# Patient Record
Sex: MALE | Race: WHITE | NOT HISPANIC OR LATINO | Employment: FULL TIME | URBAN - METROPOLITAN AREA
[De-identification: names, ages, dates, MRNs, and addresses within clinical notes are randomized per-mention and may not be internally consistent; named-entity substitution may affect disease eponyms.]

---

## 2019-12-02 ENCOUNTER — TELEPHONE (OUTPATIENT)
Dept: FAMILY MEDICINE | Facility: CLINIC | Age: 49
End: 2019-12-02

## 2019-12-02 NOTE — TELEPHONE ENCOUNTER
----- Message from Lv Aponte sent at 2019  8:23 AM CST -----  Contact: patient  Chao Chapman  MRN: [id number]  : 1970  PCP: Primary Doctor No  Home Phone      [phone]  Work Phone      Not on file.  [employer]          [phone]      MESSAGE: new patient ([employer]) -- LOV  -- lingering cough - now wheezing -- requesting appt today    Call [phone]    PCP: [place]

## 2019-12-02 NOTE — TELEPHONE ENCOUNTER
Spoke to pt stated that he would like to est care ,stated with a cold least Monday. Started with wheezing and been taking OTC meds. No history of asmtha. Declines fever. Will seek treatment at urgent care.

## 2019-12-09 ENCOUNTER — TELEPHONE (OUTPATIENT)
Dept: FAMILY MEDICINE | Facility: CLINIC | Age: 49
End: 2019-12-09

## 2019-12-09 NOTE — TELEPHONE ENCOUNTER
----- Message from Cristine King sent at 2019  8:18 AM CST -----  Contact: self  Chao Chapman  MRN: [id number]  : 1970  PCP: Primary Doctor No  Home Phone      [phone]  Work Phone      Not on file.  Mobile          [phone]      MESSAGE:   Patient would like to establish care, [employer] insurance.    Maybe has a hernia and wants to see a man doctor.    [phone]

## 2019-12-09 NOTE — TELEPHONE ENCOUNTER
Pt would like to re-est care with you. He last seen you on 1/27/2016.  Pt stated for 2 weeks now his right testicle has been swollen, but this morning he woke up with pain from his hips down to his feet. He believes this could be a possible hernia.   Only medication he is taking is Remicade IV.   Will you accept him as a pt again?

## 2020-01-08 ENCOUNTER — HOSPITAL ENCOUNTER (OUTPATIENT)
Dept: RADIOLOGY | Facility: HOSPITAL | Age: 50
Discharge: HOME | End: 2020-01-08
Attending: FAMILY MEDICINE
Payer: COMMERCIAL

## 2020-01-08 ENCOUNTER — OFFICE VISIT (OUTPATIENT)
Dept: FAMILY MEDICINE | Facility: CLINIC | Age: 50
End: 2020-01-08
Payer: COMMERCIAL

## 2020-01-08 VITALS
HEIGHT: 67 IN | SYSTOLIC BLOOD PRESSURE: 116 MMHG | HEART RATE: 70 BPM | RESPIRATION RATE: 16 BRPM | DIASTOLIC BLOOD PRESSURE: 78 MMHG | BODY MASS INDEX: 25.99 KG/M2 | WEIGHT: 165.56 LBS

## 2020-01-08 DIAGNOSIS — N45.2 ORCHITIS: Primary | ICD-10-CM

## 2020-01-08 DIAGNOSIS — N50.89 TESTICULAR SWELLING, LEFT: Primary | ICD-10-CM

## 2020-01-08 DIAGNOSIS — N50.89 TESTICULAR SWELLING, LEFT: ICD-10-CM

## 2020-01-08 DIAGNOSIS — N45.1 EPIDIDYMITIS: ICD-10-CM

## 2020-01-08 DIAGNOSIS — F17.200 SMOKER: ICD-10-CM

## 2020-01-08 PROCEDURE — 3008F PR BODY MASS INDEX (BMI) DOCUMENTED: ICD-10-PCS | Mod: CPTII,S$GLB,, | Performed by: FAMILY MEDICINE

## 2020-01-08 PROCEDURE — 99999 PR PBB SHADOW E&M-EST. PATIENT-LVL III: ICD-10-PCS | Mod: PBBFAC,,, | Performed by: FAMILY MEDICINE

## 2020-01-08 PROCEDURE — 99214 OFFICE O/P EST MOD 30 MIN: CPT | Mod: S$GLB,,, | Performed by: FAMILY MEDICINE

## 2020-01-08 PROCEDURE — 99999 PR PBB SHADOW E&M-EST. PATIENT-LVL III: CPT | Mod: PBBFAC,,, | Performed by: FAMILY MEDICINE

## 2020-01-08 PROCEDURE — 76870 US EXAM SCROTUM: CPT | Mod: TC

## 2020-01-08 PROCEDURE — 3008F BODY MASS INDEX DOCD: CPT | Mod: CPTII,S$GLB,, | Performed by: FAMILY MEDICINE

## 2020-01-08 PROCEDURE — 99214 PR OFFICE/OUTPT VISIT, EST, LEVL IV, 30-39 MIN: ICD-10-PCS | Mod: S$GLB,,, | Performed by: FAMILY MEDICINE

## 2020-01-08 RX ORDER — DOXYCYCLINE HYCLATE 100 MG
100 TABLET ORAL 2 TIMES DAILY
Qty: 60 TABLET | Refills: 0 | Status: SHIPPED | OUTPATIENT
Start: 2020-01-08 | End: 2020-02-07

## 2020-01-08 NOTE — PROGRESS NOTES
Subjective:       Patient ID: Chao Chapman is a 49 y.o. male.    Chief Complaint: re-est care    Pt is a 49 y.o. male who presents for evaluation and management of   Encounter Diagnoses   Name Primary?    Testicular swelling, left Yes    Smoker    .7 weeks   No pain   No penile discharge   No injury     Doing well on current meds. Denies any side effects. Prevention is up to date.    Review of Systems   Constitutional: Negative for chills and fever.   Respiratory: Negative for shortness of breath.    Cardiovascular: Negative for chest pain.   Gastrointestinal: Negative for abdominal pain, blood in stool, nausea and vomiting.   Genitourinary: Positive for scrotal swelling. Negative for discharge, dysuria, genital sores and testicular pain.       Objective:      Physical Exam   Constitutional: He is oriented to person, place, and time. He appears well-developed and well-nourished.   HENT:   Head: Normocephalic and atraumatic.   Right Ear: External ear normal.   Left Ear: External ear normal.   Nose: Nose normal.   Mouth/Throat: Oropharynx is clear and moist.   Eyes: Pupils are equal, round, and reactive to light. Conjunctivae and EOM are normal. Right eye exhibits no discharge. Left eye exhibits no discharge. No scleral icterus.   Neck: Normal range of motion. Neck supple. No JVD present. No tracheal deviation present. No thyromegaly present.   Cardiovascular: Normal rate, regular rhythm, normal heart sounds and intact distal pulses.   No murmur heard.  Pulmonary/Chest: Effort normal and breath sounds normal. No respiratory distress. He has no wheezes. He has no rales. He exhibits no tenderness.   Abdominal: Soft. Bowel sounds are normal. He exhibits no distension and no mass. There is no tenderness. There is no rebound and no guarding.   Genitourinary:   Genitourinary Comments: RIGHT TESTICLE ENLARGED AND INDURATED      Musculoskeletal: Normal range of motion.   Lymphadenopathy:     He has no cervical adenopathy.    Neurological: He is alert and oriented to person, place, and time. He has normal reflexes. He displays normal reflexes. No cranial nerve deficit. He exhibits normal muscle tone. Coordination normal.   Skin: Skin is warm and dry.   Psychiatric: He has a normal mood and affect. His behavior is normal. Judgment and thought content normal.       Assessment:       1. Testicular swelling, left    2. Smoker        Plan:   Chao was seen today for re-est care.    Diagnoses and all orders for this visit:    Testicular swelling, left  -     US Scrotum And Testicles; Future    Smoker  -     Lipid panel; Future  -     Comprehensive metabolic panel; Future  -     Ambulatory referral to Smoking Cessation Program      Problem List Items Addressed This Visit     None      Visit Diagnoses     Testicular swelling, left    -  Primary    Relevant Orders    US Scrotum And Testicles    Smoker        Relevant Orders    Lipid panel    Comprehensive metabolic panel    Ambulatory referral to Smoking Cessation Program        No follow-ups on file.

## 2020-01-09 NOTE — PROGRESS NOTES
He has what looks like an infection in his testicle. I need him to be on abx for a month---I sent in doxy  Come see me back in a month. We will need to repeat u/s at that time

## 2020-02-12 ENCOUNTER — TELEPHONE (OUTPATIENT)
Dept: FAMILY MEDICINE | Facility: CLINIC | Age: 50
End: 2020-02-12

## 2020-02-12 DIAGNOSIS — N45.1 EPIDIDYMITIS: Primary | ICD-10-CM

## 2020-02-12 NOTE — TELEPHONE ENCOUNTER
----- Message from Keren Mistry sent at 2020  2:41 PM CST -----  Contact: self  Chao Chapman  MRN: [id number]  : 1970  PCP: Primary Doctor No  Home Phone      [phone]  Work Phone      Not on file.  Mobile          [phone]      MESSAGE:   Pt states he was instructed to call when he was almost finished his antibiotics to get another ultrasound scheduled. Please return call @ [phone]. Thanks.

## 2020-02-12 NOTE — TELEPHONE ENCOUNTER
Pt stated he is almost finished his abx. He said that he was informed to call to get another ultrasound scheduled.   If pts needs this please order test. Thank you

## 2020-02-20 ENCOUNTER — TELEMEDICINE (OUTPATIENT)
Dept: FAMILY MEDICINE | Facility: CLINIC | Age: 50
End: 2020-02-20

## 2020-02-20 ENCOUNTER — HOSPITAL ENCOUNTER (OUTPATIENT)
Dept: RADIOLOGY | Facility: HOSPITAL | Age: 50
Discharge: HOME | End: 2020-02-20
Attending: FAMILY MEDICINE
Payer: COMMERCIAL

## 2020-02-20 DIAGNOSIS — N45.1 EPIDIDYMITIS: ICD-10-CM

## 2020-02-20 PROCEDURE — 76870 US EXAM SCROTUM: CPT | Mod: 26,,, | Performed by: RADIOLOGY

## 2020-02-20 PROCEDURE — 76870 US EXAM SCROTUM: CPT | Mod: TC

## 2020-02-20 PROCEDURE — 76870 US SCROTUM AND TESTICLES: ICD-10-PCS | Mod: 26,,, | Performed by: RADIOLOGY

## 2020-02-21 ENCOUNTER — TELEPHONE (OUTPATIENT)
Dept: FAMILY MEDICINE | Facility: CLINIC | Age: 50
End: 2020-02-21

## 2020-02-21 DIAGNOSIS — D49.59 TESTICULAR NEOPLASM: Primary | ICD-10-CM

## 2020-02-21 NOTE — TELEPHONE ENCOUNTER
Scheduled pt an urology appt at [facility] with Dr. Harjit Tyler on February 26 at 3pm.  Called pt and notified him of appt, he verbally understood

## 2020-02-26 ENCOUNTER — LAB VISIT (OUTPATIENT)
Dept: LAB | Facility: HOSPITAL | Age: 50
End: 2020-02-26
Attending: UROLOGY
Payer: COMMERCIAL

## 2020-02-26 ENCOUNTER — TELEPHONE (OUTPATIENT)
Dept: UROLOGY | Facility: CLINIC | Age: 50
End: 2020-02-26

## 2020-02-26 ENCOUNTER — OFFICE VISIT (OUTPATIENT)
Dept: UROLOGY | Facility: CLINIC | Age: 50
End: 2020-02-26
Payer: COMMERCIAL

## 2020-02-26 VITALS
BODY MASS INDEX: 25.96 KG/M2 | SYSTOLIC BLOOD PRESSURE: 128 MMHG | HEIGHT: 67 IN | HEART RATE: 72 BPM | DIASTOLIC BLOOD PRESSURE: 83 MMHG | WEIGHT: 165.38 LBS

## 2020-02-26 DIAGNOSIS — D49.59 TESTICULAR NEOPLASM: ICD-10-CM

## 2020-02-26 DIAGNOSIS — N50.89 TESTICULAR MASS: Primary | ICD-10-CM

## 2020-02-26 LAB
AFP SERPL-MCNC: 2.2 NG/ML (ref 0–8.4)
CREAT SERPL-MCNC: 1 MG/DL (ref 0.5–1.4)
EST. GFR  (AFRICAN AMERICAN): >60 ML/MIN/1.73 M^2
EST. GFR  (NON AFRICAN AMERICAN): >60 ML/MIN/1.73 M^2
HCG INTACT+B SERPL-ACNC: 16 MIU/ML
LDH SERPL L TO P-CCNC: 212 U/L (ref 110–260)

## 2020-02-26 PROCEDURE — 99244 PR OFFICE CONSULTATION,LEVEL IV: ICD-10-PCS | Mod: S$GLB,,, | Performed by: UROLOGY

## 2020-02-26 PROCEDURE — 84702 CHORIONIC GONADOTROPIN TEST: CPT

## 2020-02-26 PROCEDURE — 36415 COLL VENOUS BLD VENIPUNCTURE: CPT

## 2020-02-26 PROCEDURE — 99999 PR PBB SHADOW E&M-EST. PATIENT-LVL III: CPT | Mod: PBBFAC,,, | Performed by: UROLOGY

## 2020-02-26 PROCEDURE — 99999 PR PBB SHADOW E&M-EST. PATIENT-LVL III: ICD-10-PCS | Mod: PBBFAC,,, | Performed by: UROLOGY

## 2020-02-26 PROCEDURE — 82565 ASSAY OF CREATININE: CPT

## 2020-02-26 PROCEDURE — 83615 LACTATE (LD) (LDH) ENZYME: CPT

## 2020-02-26 PROCEDURE — 99244 OFF/OP CNSLTJ NEW/EST MOD 40: CPT | Mod: S$GLB,,, | Performed by: UROLOGY

## 2020-02-26 PROCEDURE — 82105 ALPHA-FETOPROTEIN SERUM: CPT

## 2020-02-26 NOTE — H&P (VIEW-ONLY)
CHIEF COMPLAINT:    Mr. Chapman is a 49 y.o. male presenting for a consultation at the request of Dr. Oliva. Patient presents with R testicular swelling.    PRESENTING ILLNESS:    Chao Chapman is a 49 y.o. male who noticed R testicular swelling > 4 months ago.  Originally grew in size, but it has stabilized over the past month.  He presented to his PCP who diagnosed with him orchitis.  A scrotal u/s was done that was c/w orchitis.  Was given a 1 month course of doxy with no change in the swelling.  He then underwent a repeat scrotal u/s showing no change.  I reviewed his films personally.  No pain.      He has LUTS.  Nocturia x 1.  Good FOS.  No hematuria.  No dysuria.    He denies ED.    REVIEW OF SYSTEMS:    Chao Chapman denies headache, blurred vision, fever, nausea, vomiting, chills, abdominal pain, chest pain, sore throat, bleeding per rectum, cough, SOB, recent loss of consciousness, recent mental status changes, seizures, dizziness, or upper or lower extremity weakness.    JEIMY  1. 5  2. 5  3. 5  4. 5  5. 5      PATIENT HISTORY:    Past Medical History:   Diagnosis Date    Abscess of groin, right     Abscess of right thigh     Anemia     Colonic fistula     Crohn's disease     History of abdominal abscess     Perforated bowel     Severe sepsis        Past Surgical History:   Procedure Laterality Date    COLONOSCOPY      COLONOSCOPY N/A 11/29/2016    Procedure: COLONOSCOPY;  Surgeon: Danika Saunders MD;  Location: [facility];  Service: Endoscopy;  Laterality: N/A;    COLONOSCOPY N/A 8/29/2019    Procedure: COLONOSCOPY;  Surgeon: Melecio Saunders MD;  Location: [facility];  Service: Endoscopy;  Laterality: N/A;    ILEOSTOMY      ILEOSTOMY CLOSURE  07/22/2016    SKIN BIOPSY      SMALL INTESTINE SURGERY      UPPER GASTROINTESTINAL ENDOSCOPY         Family History   Problem Relation Age of Onset    Diabetes Maternal Grandmother        Social History     Socioeconomic History     Marital status:      Spouse name: Not on file    Number of children: Not on file    Years of education: unknown    Highest education level: Not on file   Occupational History    Not on file   Social Needs    Financial resource strain: Not on file    Food insecurity:     Worry: Not on file     Inability: Not on file    Transportation needs:     Medical: Not on file     Non-medical: Not on file   Tobacco Use    Smoking status: Current Every Day Smoker     Packs/day: 0.50     Types: Cigarettes    Smokeless tobacco: Never Used   Substance and Sexual Activity    Alcohol use: No    Drug use: Yes     Types: Marijuana     Comment: daily    Sexual activity: Yes     Partners: Female   Lifestyle    Physical activity:     Days per week: Not on file     Minutes per session: Not on file    Stress: Not on file   Relationships    Social connections:     Talks on phone: Not on file     Gets together: Not on file     Attends Sikh service: Not on file     Active member of club or organization: Not on file     Attends meetings of clubs or organizations: Not on file     Relationship status: Not on file   Other Topics Concern    Not on file   Social History Narrative    Not on file       Allergies:  Patient has no known allergies.    Medications:    Current Outpatient Medications:     INFLIXIMAB (REMICADE IV), Inject into the vein., Disp: , Rfl:     PHYSICAL EXAMINATION:    The patient generally appears in good health, is appropriately interactive, and is in no apparent distress.     Eyes: anicteric sclerae, moist conjunctivae; no lid-lag; PERRLA     HENT: Atraumatic; oropharynx clear with moist mucous membranes and no mucosal ulcerations;normal hard and soft palate.  No evidence of lymphadenopathy.    Neck: Trachea midline.  No thyromegaly.    Musculoskeletal: No abnormal gait.    Skin: No lesions.    Mental: Cooperative with normal affect.  Is oriented to time, place, and person.    Neuro: Grossly  intact.    Chest: Normal inspiratory effort.   No accessory muscles.  No audible wheezes.  Respirations symmetric on inspiration and expiration.    Heart: Regular rhythm.      Abdomen:  Soft, non-tender. No masses or organomegaly. Bladder is not palpable. No evidence of flank discomfort. No evidence of inguinal hernia.    Genitourinary: The penis is circumcised with no evidence of plaques or induration. The urethral meatus is normal. The testes, epididymides, and cord structures are normal in size and contour on the L.. His R testicle is firm.      Extremities: No clubbing, cyanosis, or edema      LABS:      No results found for: PSA, PSADIAG, PSATOTAL, PSAFREE, PSAFREEPCT    IMPRESSION:    Encounter Diagnoses   Name Primary?    Testicular neoplasm          PLAN:    1. Discussed that his exam and u/s are concerning for malignancy.  Recommend R orchiectomy.  2. Will draw LDH, HCG, and AFP.  3. Will get CT chest/abd/pelvis.  4. I  have explained the risk, benefits, and alternatives of the procedure in detail. The patient voices understanding and all questions have been answered. The patient agrees to proceed as planned.      Copy to:

## 2020-02-26 NOTE — LETTER
February 26, 2020      Miguel Oliva MD  [address]  [facility] [address]           [facility] 4th [facility]  [address]  [facility] [address]  Phone: [phone]          Patient: Chao Chapman   MR Number: [id number]   YOB: 1970   Date of Visit: 2/26/2020       Dear Dr. Miguel Oliva:    Thank you for referring Chao Chapman to me for evaluation. Attached you will find relevant portions of my assessment and plan of care.    If you have questions, please do not hesitate to call me. I look forward to following Chao Chapman along with you.    Sincerely,    Harjit Tyler MD    Enclosure  CC:  No Recipients    If you would like to receive this communication electronically, please contact [email or web address] or [phone] to request more information on [employer] Link access.    For providers and/or their staff who would like to refer a patient to [facility], please contact us through our one-stop-shop provider referral line, [facility], at [phone].    If you feel you have received this communication in error or would no longer like to receive these types of communications, please e-mail [email or web address]

## 2020-02-26 NOTE — PROGRESS NOTES
CHIEF COMPLAINT:    Mr. Chapman is a 49 y.o. male presenting for a consultation at the request of Dr. Oliva. Patient presents with R testicular swelling.    PRESENTING ILLNESS:    Chao Chapman is a 49 y.o. male who noticed R testicular swelling > 4 months ago.  Originally grew in size, but it has stabilized over the past month.  He presented to his PCP who diagnosed with him orchitis.  A scrotal u/s was done that was c/w orchitis.  Was given a 1 month course of doxy with no change in the swelling.  He then underwent a repeat scrotal u/s showing no change.  I reviewed his films personally.  No pain.      He has LUTS.  Nocturia x 1.  Good FOS.  No hematuria.  No dysuria.    He denies ED.    REVIEW OF SYSTEMS:    Chao Chapman denies headache, blurred vision, fever, nausea, vomiting, chills, abdominal pain, chest pain, sore throat, bleeding per rectum, cough, SOB, recent loss of consciousness, recent mental status changes, seizures, dizziness, or upper or lower extremity weakness.    JEIMY  1. 5  2. 5  3. 5  4. 5  5. 5      PATIENT HISTORY:    Past Medical History:   Diagnosis Date    Abscess of groin, right     Abscess of right thigh     Anemia     Colonic fistula     Crohn's disease     History of abdominal abscess     Perforated bowel     Severe sepsis        Past Surgical History:   Procedure Laterality Date    COLONOSCOPY      COLONOSCOPY N/A 11/29/2016    Procedure: COLONOSCOPY;  Surgeon: Danika Saunders MD;  [facility]  Service: Endoscopy;  Laterality: N/A;    COLONOSCOPY N/A 8/29/2019    Procedure: COLONOSCOPY;  Surgeon: Melecio Saunders MD;  [facility];  Service: Endoscopy;  Laterality: N/A;    ILEOSTOMY      ILEOSTOMY CLOSURE  07/22/2016    SKIN BIOPSY      SMALL INTESTINE SURGERY      UPPER GASTROINTESTINAL ENDOSCOPY         Family History   Problem Relation Age of Onset    Diabetes Maternal Grandmother        Social History     Socioeconomic History     Marital status:      Spouse name: Not on file    Number of children: Not on file    Years of education: unknown    Highest education level: Not on file   Occupational History    Not on file   Social Needs    Financial resource strain: Not on file    Food insecurity:     Worry: Not on file     Inability: Not on file    Transportation needs:     Medical: Not on file     Non-medical: Not on file   Tobacco Use    Smoking status: Current Every Day Smoker     Packs/day: 0.50     Types: Cigarettes    Smokeless tobacco: Never Used   Substance and Sexual Activity    Alcohol use: No    Drug use: Yes     Types: Marijuana     Comment: daily    Sexual activity: Yes     Partners: Female   Lifestyle    Physical activity:     Days per week: Not on file     Minutes per session: Not on file    Stress: Not on file   Relationships    Social connections:     Talks on phone: Not on file     Gets together: Not on file     Attends Restorationist service: Not on file     Active member of club or organization: Not on file     Attends meetings of clubs or organizations: Not on file     Relationship status: Not on file   Other Topics Concern    Not on file   Social History Narrative    Not on file       Allergies:  Patient has no known allergies.    Medications:    Current Outpatient Medications:     INFLIXIMAB (REMICADE IV), Inject into the vein., Disp: , Rfl:     PHYSICAL EXAMINATION:    The patient generally appears in good health, is appropriately interactive, and is in no apparent distress.     Eyes: anicteric sclerae, moist conjunctivae; no lid-lag; PERRLA     HENT: Atraumatic; oropharynx clear with moist mucous membranes and no mucosal ulcerations;normal hard and soft palate.  No evidence of lymphadenopathy.    Neck: Trachea midline.  No thyromegaly.    Musculoskeletal: No abnormal gait.    Skin: No lesions.    Mental: Cooperative with normal affect.  Is oriented to time, place, and person.    Neuro: Grossly  intact.    Chest: Normal inspiratory effort.   No accessory muscles.  No audible wheezes.  Respirations symmetric on inspiration and expiration.    Heart: Regular rhythm.      Abdomen:  Soft, non-tender. No masses or organomegaly. Bladder is not palpable. No evidence of flank discomfort. No evidence of inguinal hernia.    Genitourinary: The penis is circumcised with no evidence of plaques or induration. The urethral meatus is normal. The testes, epididymides, and cord structures are normal in size and contour on the L.. His R testicle is firm.      Extremities: No clubbing, cyanosis, or edema      LABS:      No results found for: PSA, PSADIAG, PSATOTAL, PSAFREE, PSAFREEPCT    IMPRESSION:    Encounter Diagnoses   Name Primary?    Testicular neoplasm          PLAN:    1. Discussed that his exam and u/s are concerning for malignancy.  Recommend R orchiectomy.  2. Will draw LDH, HCG, and AFP.  3. Will get CT chest/abd/pelvis.  4. I  have explained the risk, benefits, and alternatives of the procedure in detail. The patient voices understanding and all questions have been answered. The patient agrees to proceed as planned.      Copy to:

## 2020-02-26 NOTE — LETTER
February 26, 2020        Miguel Oliva MD  [address]  [facility] [address]             [facility] - Urology 4th Floor  [address]  [facility] [address]  Phone: [phone]   Patient: Chao Chapman   MR Number: [id number]   YOB: 1970   Date of Visit: 2/26/2020       Dear Dr. Oliva:    Thank you for referring Chao Chapman to me for evaluation. Attached you will find relevant portions of my assessment and plan of care.    If you have questions, please do not hesitate to call me. I look forward to following Chao Chapman along with you.    Sincerely,      Harjit Tyler MD            CC  No Recipients    Enclosure

## 2020-03-02 ENCOUNTER — TELEPHONE (OUTPATIENT)
Dept: UROLOGY | Facility: CLINIC | Age: 50
End: 2020-03-02

## 2020-03-02 ENCOUNTER — HOSPITAL ENCOUNTER (OUTPATIENT)
Dept: RADIOLOGY | Facility: HOSPITAL | Age: 50
Discharge: HOME | End: 2020-03-02
Attending: UROLOGY
Payer: COMMERCIAL

## 2020-03-02 DIAGNOSIS — D49.59 TESTICULAR NEOPLASM: ICD-10-CM

## 2020-03-02 PROCEDURE — 25500020 PHARM REV CODE 255: Performed by: UROLOGY

## 2020-03-02 PROCEDURE — 74177 CT CHEST ABDOMEN PELVIS WITH CONTRAST (XPD): ICD-10-PCS | Mod: 26,,, | Performed by: INTERNAL MEDICINE

## 2020-03-02 PROCEDURE — 74177 CT ABD & PELVIS W/CONTRAST: CPT | Mod: 26,,, | Performed by: INTERNAL MEDICINE

## 2020-03-02 PROCEDURE — 74177 CT ABD & PELVIS W/CONTRAST: CPT | Mod: TC

## 2020-03-02 PROCEDURE — 71260 CT CHEST ABDOMEN PELVIS WITH CONTRAST (XPD): ICD-10-PCS | Mod: 26,,, | Performed by: INTERNAL MEDICINE

## 2020-03-02 PROCEDURE — 71260 CT THORAX DX C+: CPT | Mod: 26,,, | Performed by: INTERNAL MEDICINE

## 2020-03-02 RX ADMIN — IOHEXOL 75 ML: 350 INJECTION, SOLUTION INTRAVENOUS at 04:03

## 2020-03-02 NOTE — TELEPHONE ENCOUNTER
Called pt to confirm arrival time of 1130am for procedure on 3-3-20. Gave pt NPO instructions and gave pt opportunity to ask questions. Pt verbalized understanding.

## 2020-03-02 NOTE — PRE-PROCEDURE INSTRUCTIONS
Preop instructions:     NPO instructions: NO solids foods,non-clear liquids including milk, milk products, creamers, gum, mints, or hard candy after midnight the night prior to your surgery or 8 hours prior to your SX start time.      We encourage a limited consumption of CLEAR LIQUIDS after midnight the night before your surgery up to 2 hrs prior to your SX start time. 1045     Acceptable Clear Liquids are listed below:    Water, Gatorade/Powerade sports drinks, Apple juice (no pulp) Black coffee with without sugar/NO milk, cream or creamer or Jello.       If you have received specific instructions from your Surgeon/Surgeon's staff, please follow their instructions.     Showering instructions, directions, leave all valuables at home, medication instructions for PM prior & am of procedure explained. Patient stated an understanding.      Patient denies any side effects or issues with anesthesia or sedation.                                                                                                                                    ARRIVAL TO [place] [address]  WIFE - [employer] WILL BE PROVIDING TRANSPORTATION HOME UPON DISCHARGE.

## 2020-03-02 NOTE — TELEPHONE ENCOUNTER
Patient arrival time was moved up, pt notified and verbally understood.  Called pt to confirm arrival time of 900am for procedure on 3-3-20. Gave pt NPO instructions and gave pt opportunity to ask questions. Pt verbalized understanding.

## 2020-03-03 ENCOUNTER — ANESTHESIA EVENT (OUTPATIENT)
Dept: SURGERY | Facility: HOSPITAL | Age: 50
End: 2020-03-03
Payer: COMMERCIAL

## 2020-03-03 ENCOUNTER — HOSPITAL ENCOUNTER (OUTPATIENT)
Facility: HOSPITAL | Age: 50
Discharge: HOME | End: 2020-03-03
Attending: UROLOGY | Admitting: UROLOGY
Payer: COMMERCIAL

## 2020-03-03 ENCOUNTER — ANESTHESIA (OUTPATIENT)
Dept: SURGERY | Facility: HOSPITAL | Age: 50
End: 2020-03-03
Payer: COMMERCIAL

## 2020-03-03 DIAGNOSIS — N50.89 TESTICULAR MASS: Primary | ICD-10-CM

## 2020-03-03 PROCEDURE — 25000003 PHARM REV CODE 250: Performed by: STUDENT IN AN ORGANIZED HEALTH CARE EDUCATION/TRAINING PROGRAM

## 2020-03-03 PROCEDURE — 37000009 HC ANESTHESIA EA ADD 15 MINS: Performed by: UROLOGY

## 2020-03-03 PROCEDURE — 25000003 PHARM REV CODE 250: Performed by: UROLOGY

## 2020-03-03 PROCEDURE — 54530 PR REMOVAL TESTIS,RADICAL: ICD-10-PCS | Mod: RT,,, | Performed by: UROLOGY

## 2020-03-03 PROCEDURE — 88305 TISSUE EXAM BY PATHOLOGIST: CPT | Mod: 26,,, | Performed by: PATHOLOGY

## 2020-03-03 PROCEDURE — 88309 PR  SURG PATH,LEVEL VI: ICD-10-PCS | Mod: 26,,, | Performed by: PATHOLOGY

## 2020-03-03 PROCEDURE — 71000015 HC POSTOP RECOV 1ST HR: Performed by: UROLOGY

## 2020-03-03 PROCEDURE — D9220A PRA ANESTHESIA: ICD-10-PCS | Mod: CRNA,,, | Performed by: NURSE ANESTHETIST, CERTIFIED REGISTERED

## 2020-03-03 PROCEDURE — 36000707: Performed by: UROLOGY

## 2020-03-03 PROCEDURE — 25000003 PHARM REV CODE 250: Performed by: NURSE ANESTHETIST, CERTIFIED REGISTERED

## 2020-03-03 PROCEDURE — 63600175 PHARM REV CODE 636 W HCPCS: Performed by: NURSE ANESTHETIST, CERTIFIED REGISTERED

## 2020-03-03 PROCEDURE — 88309 TISSUE EXAM BY PATHOLOGIST: CPT | Mod: 26,,, | Performed by: PATHOLOGY

## 2020-03-03 PROCEDURE — D9220A PRA ANESTHESIA: ICD-10-PCS | Mod: ANES,,, | Performed by: ANESTHESIOLOGY

## 2020-03-03 PROCEDURE — S0028 INJECTION, FAMOTIDINE, 20 MG: HCPCS | Performed by: NURSE ANESTHETIST, CERTIFIED REGISTERED

## 2020-03-03 PROCEDURE — 71000044 HC DOSC ROUTINE RECOVERY FIRST HOUR: Performed by: UROLOGY

## 2020-03-03 PROCEDURE — 88305 TISSUE EXAM BY PATHOLOGIST: ICD-10-PCS | Mod: 26,,, | Performed by: PATHOLOGY

## 2020-03-03 PROCEDURE — 63600175 PHARM REV CODE 636 W HCPCS: Performed by: ANESTHESIOLOGY

## 2020-03-03 PROCEDURE — 54530 REMOVAL OF TESTIS: CPT | Mod: RT,,, | Performed by: UROLOGY

## 2020-03-03 PROCEDURE — 88341 PR IHC OR ICC EACH ADD'L SINGLE ANTIBODY  STAINPR: ICD-10-PCS | Mod: 26,,, | Performed by: PATHOLOGY

## 2020-03-03 PROCEDURE — 63600175 PHARM REV CODE 636 W HCPCS: Performed by: UROLOGY

## 2020-03-03 PROCEDURE — 36000706: Performed by: UROLOGY

## 2020-03-03 PROCEDURE — 88341 IMHCHEM/IMCYTCHM EA ADD ANTB: CPT | Mod: 26,,, | Performed by: PATHOLOGY

## 2020-03-03 PROCEDURE — 88341 IMHCHEM/IMCYTCHM EA ADD ANTB: CPT | Performed by: PATHOLOGY

## 2020-03-03 PROCEDURE — 88342 IMHCHEM/IMCYTCHM 1ST ANTB: CPT | Performed by: PATHOLOGY

## 2020-03-03 PROCEDURE — 63600175 PHARM REV CODE 636 W HCPCS: Performed by: STUDENT IN AN ORGANIZED HEALTH CARE EDUCATION/TRAINING PROGRAM

## 2020-03-03 PROCEDURE — 88309 TISSUE EXAM BY PATHOLOGIST: CPT | Performed by: PATHOLOGY

## 2020-03-03 PROCEDURE — 71000045 HC DOSC ROUTINE RECOVERY EA ADD'L HR: Performed by: UROLOGY

## 2020-03-03 PROCEDURE — 88342 IMHCHEM/IMCYTCHM 1ST ANTB: CPT | Mod: 26,,, | Performed by: PATHOLOGY

## 2020-03-03 PROCEDURE — 88342 CHG IMMUNOCYTOCHEMISTRY: ICD-10-PCS | Mod: 26,,, | Performed by: PATHOLOGY

## 2020-03-03 PROCEDURE — D9220A PRA ANESTHESIA: Mod: ANES,,, | Performed by: ANESTHESIOLOGY

## 2020-03-03 PROCEDURE — D9220A PRA ANESTHESIA: Mod: CRNA,,, | Performed by: NURSE ANESTHETIST, CERTIFIED REGISTERED

## 2020-03-03 PROCEDURE — 37000008 HC ANESTHESIA 1ST 15 MINUTES: Performed by: UROLOGY

## 2020-03-03 RX ORDER — LIDOCAINE HYDROCHLORIDE 20 MG/ML
INJECTION INTRAVENOUS
Status: DISCONTINUED | OUTPATIENT
Start: 2020-03-03 | End: 2020-03-03

## 2020-03-03 RX ORDER — KETAMINE HCL IN 0.9 % NACL 50 MG/5 ML
SYRINGE (ML) INTRAVENOUS
Status: DISCONTINUED | OUTPATIENT
Start: 2020-03-03 | End: 2020-03-03

## 2020-03-03 RX ORDER — GLYCOPYRROLATE 0.2 MG/ML
INJECTION INTRAMUSCULAR; INTRAVENOUS
Status: DISCONTINUED | OUTPATIENT
Start: 2020-03-03 | End: 2020-03-03

## 2020-03-03 RX ORDER — FENTANYL CITRATE 50 UG/ML
INJECTION, SOLUTION INTRAMUSCULAR; INTRAVENOUS
Status: DISCONTINUED | OUTPATIENT
Start: 2020-03-03 | End: 2020-03-03

## 2020-03-03 RX ORDER — HYDROCODONE BITARTRATE AND ACETAMINOPHEN 5; 325 MG/1; MG/1
1 TABLET ORAL EVERY 6 HOURS PRN
Qty: 11 TABLET | Refills: 0 | Status: SHIPPED | OUTPATIENT
Start: 2020-03-03 | End: 2020-03-13

## 2020-03-03 RX ORDER — FAMOTIDINE 10 MG/ML
INJECTION INTRAVENOUS
Status: DISCONTINUED | OUTPATIENT
Start: 2020-03-03 | End: 2020-03-03

## 2020-03-03 RX ORDER — HYDROCODONE BITARTRATE AND ACETAMINOPHEN 5; 325 MG/1; MG/1
1 TABLET ORAL ONCE AS NEEDED
Status: COMPLETED | OUTPATIENT
Start: 2020-03-03 | End: 2020-03-03

## 2020-03-03 RX ORDER — PROPOFOL 10 MG/ML
VIAL (ML) INTRAVENOUS
Status: DISCONTINUED | OUTPATIENT
Start: 2020-03-03 | End: 2020-03-03

## 2020-03-03 RX ORDER — CEFAZOLIN SODIUM 1 G/3ML
2 INJECTION, POWDER, FOR SOLUTION INTRAMUSCULAR; INTRAVENOUS
Status: COMPLETED | OUTPATIENT
Start: 2020-03-03 | End: 2020-03-03

## 2020-03-03 RX ORDER — HYDRALAZINE HYDROCHLORIDE 20 MG/ML
5 INJECTION INTRAMUSCULAR; INTRAVENOUS ONCE
Status: COMPLETED | OUTPATIENT
Start: 2020-03-03 | End: 2020-03-03

## 2020-03-03 RX ORDER — MIDAZOLAM HYDROCHLORIDE 1 MG/ML
INJECTION, SOLUTION INTRAMUSCULAR; INTRAVENOUS
Status: DISCONTINUED | OUTPATIENT
Start: 2020-03-03 | End: 2020-03-03

## 2020-03-03 RX ORDER — ONDANSETRON 2 MG/ML
4 INJECTION INTRAMUSCULAR; INTRAVENOUS ONCE AS NEEDED
Status: DISCONTINUED | OUTPATIENT
Start: 2020-03-03 | End: 2020-03-03 | Stop reason: HOSPADM

## 2020-03-03 RX ORDER — BUPIVACAINE HYDROCHLORIDE 2.5 MG/ML
INJECTION, SOLUTION EPIDURAL; INFILTRATION; INTRACAUDAL
Status: DISCONTINUED | OUTPATIENT
Start: 2020-03-03 | End: 2020-03-03 | Stop reason: HOSPADM

## 2020-03-03 RX ORDER — SODIUM CHLORIDE 9 MG/ML
INJECTION, SOLUTION INTRAVENOUS CONTINUOUS
Status: DISCONTINUED | OUTPATIENT
Start: 2020-03-03 | End: 2020-03-03 | Stop reason: HOSPADM

## 2020-03-03 RX ORDER — LIDOCAINE HYDROCHLORIDE 10 MG/ML
1 INJECTION, SOLUTION EPIDURAL; INFILTRATION; INTRACAUDAL; PERINEURAL ONCE
Status: DISCONTINUED | OUTPATIENT
Start: 2020-03-03 | End: 2020-03-03 | Stop reason: HOSPADM

## 2020-03-03 RX ADMIN — PROPOFOL 200 MG: 10 INJECTION, EMULSION INTRAVENOUS at 11:03

## 2020-03-03 RX ADMIN — GLYCOPYRROLATE 0.2 MG: 0.2 INJECTION, SOLUTION INTRAMUSCULAR; INTRAVENOUS at 11:03

## 2020-03-03 RX ADMIN — FAMOTIDINE 20 MG: 10 INJECTION, SOLUTION INTRAVENOUS at 11:03

## 2020-03-03 RX ADMIN — FENTANYL CITRATE 25 MCG: 50 INJECTION, SOLUTION INTRAMUSCULAR; INTRAVENOUS at 11:03

## 2020-03-03 RX ADMIN — Medication 25 MG: at 11:03

## 2020-03-03 RX ADMIN — CEFAZOLIN 2 G: 330 INJECTION, POWDER, FOR SOLUTION INTRAMUSCULAR; INTRAVENOUS at 11:03

## 2020-03-03 RX ADMIN — LIDOCAINE HYDROCHLORIDE 100 MG: 20 INJECTION, SOLUTION INTRAVENOUS at 11:03

## 2020-03-03 RX ADMIN — HYDRALAZINE HYDROCHLORIDE 5 MG: 20 INJECTION INTRAMUSCULAR; INTRAVENOUS at 01:03

## 2020-03-03 RX ADMIN — MIDAZOLAM HYDROCHLORIDE 2 MG: 1 INJECTION, SOLUTION INTRAMUSCULAR; INTRAVENOUS at 11:03

## 2020-03-03 RX ADMIN — HYDROCODONE BITARTRATE AND ACETAMINOPHEN 1 TABLET: 5; 325 TABLET ORAL at 01:03

## 2020-03-03 RX ADMIN — PROPOFOL 70 MG: 10 INJECTION, EMULSION INTRAVENOUS at 11:03

## 2020-03-03 RX ADMIN — SODIUM CHLORIDE: 0.9 INJECTION, SOLUTION INTRAVENOUS at 10:03

## 2020-03-03 NOTE — ANESTHESIA POSTPROCEDURE EVALUATION
Anesthesia Post Evaluation    Patient: Chao Chapman    Procedure(s) Performed: Procedure(s) (LRB):  ORCHIECTOMY (Right)    Final Anesthesia Type: general    Patient location during evaluation: PACU  Patient participation: Yes- Able to Participate  Level of consciousness: awake and alert and oriented  Post-procedure vital signs: reviewed and stable  Pain management: adequate  Airway patency: patent    PONV status at discharge: No PONV  Anesthetic complications: no      Cardiovascular status: hemodynamically stable  Respiratory status: unassisted  Hydration status: euvolemic  Follow-up not needed.          Vitals Value Taken Time   /80 3/3/2020  2:30 PM   Temp 36.2 °C (97.2 °F) 3/3/2020  2:30 PM   Pulse 56 3/3/2020  2:30 PM   Resp 23 3/3/2020  2:30 PM   SpO2 95 % 3/3/2020  2:30 PM   Vitals shown include unvalidated device data.      No case tracking events are documented in the log.      Pain/Naina Score: Pain Rating Prior to Med Admin: 5 (3/3/2020  2:30 PM)  Pain Rating Post Med Admin: 3 (3/3/2020  2:30 PM)  Naina Score: 10 (3/3/2020  2:20 PM)

## 2020-03-03 NOTE — DISCHARGE INSTRUCTIONS
Discharge Instructions for Orchiectomy  You had an orchiectomy, a surgical procedure to remove one or both of your testicles. This is usually done when a man has a testicular mass that is highly suspicious for cancer. It is also done to treat prostate cancer by eliminating the main sources of testosterone, which are the testicles. Here are some general instructions to help you care for yourself after the surgery. Always check with your health care provider for specific instructions.  Activity  · Do not worry if you feel more tired than usual. Fatigue and weakness are common for a few weeks after this surgery.  · Listen to your body. If an activity causes pain, stop.  · Avoid strenuous activities, such as mowing the lawn, vacuuming, or playing sports for 2-4 weeks following surgery.  · Do not drive until you are off your pain medication and are pain-free. This typically takes 2-4 weeks.  · Do not lift anything heavier than 10 pounds for 4 weeks.  · Avoid sexual activity for 2-4 weeks.  Home care  · Shower as needed. But dont swim or use a bathtub or hot tub until after your follow-up appointment.  · Keep your incision clean and dry. You may wash your incision gently with mild soap and warm water when necessary.  · Wear an athletic supporter (also called a jockstrap) for the first few days. And wear supportive briefs rather than boxer shorts.  · Eat a healthy, well-balanced diet.  · Drink 6-8 glasses of water a day unless instructed otherwise by your health care provider.  · Eat high-fiber foods to avoid constipation. Also, use laxatives, stool softeners, or enemas as directed by your health care provider.  · Finish all of the antibiotics your doctor prescribed to you, even if you feel better. Antibiotics help keep you from getting an infection.  · Follow your health care providers instructions for taking any pain medication.  Follow-up  Make a follow-up appointment as directed by your health care provider.  When  to call your health care provider  Call your health care provider right away if you have any of the following:  · Fever of 100.4°F (38.0°C) or higher, or shaking chills  · Redness, swelling, warmth, or pain at your incision site  · Drainage, pus, or bleeding from your incision  · Incision that opens up or pulls apart   Date Last Reviewed: 9/24/2014  © 5364-5253 [employer]. [address] [place] [address], [place] [address]. All rights reserved. This information is not intended as a substitute for professional medical care. Always follow your healthcare professional's instructions.

## 2020-03-03 NOTE — OP NOTE
[facility]  Operative Note    Date: 03/03/2020    Pre-Op Diagnosis: Right testicular mass    Patient Active Problem List   Diagnosis    Crohn disease    Abscess of right groin    Abscess of right thigh    Severe protein-calorie malnutrition    Iron deficiency anemia    Crohn's disease of small intestine with abscess    Ileostomy in place    Crohn's disease    Testicular mass       Post-Op Diagnosis: same    Procedure(s) Performed:   1.  Right radical orchiectomy    Specimen(s): Right testicle    Staff Surgeon: Harjit Tyler MD    Assistant Surgeon: Niko Greco MD    Anesthesia: General LMA anesthesia    Indications: Chao Chapman is a 49 y.o. male with history of right scrotal swelling. His PCP initially treated him for orchitis with antibiotics. The swelling did not improve. He followed up with urology and physical examination and ultrasound were concerning for right testicular neoplasm. His tumor markers are normal except for a slightly elevated hGC.     Findings:   - Right inguinal orchiectomy performed without complication.     Estimated Blood Loss: min    Drains: none    Procedure in detail:  After the risks and benefits of the procedure were explained, informed consent was obtained. The patient was taken to the operating room and placed int he supine position.  SCDs were applied and working.  Anesthesia was administered.  The patient was shaved, prepped and draped in the usual sterile fashion. Timeout was performed and preoperative antibiotics were confirmed.      A right 5 cm inguinal incision was made sharply with a 15 blade scapel through the dermis. The underlying subcutaneous tissue was disected with  Bovie electrocautery down to the external oblique fascia. Once the external oblique was identified it was bluntly dissected free from the overlying subcutaneous tissue. Metzenbaum scissors were used to dissect the external oblique from the ilioinguinal nerve, paying careful  attention not to injure it.  The external oblique was opened.  The ilioinguinal nerve was identified and excluded. The cord was then freed bluntly from its attachments and a penrose drain was wrapped around the cord twice. The testicle was delivered thru the incision and the gubernaculum was identified and carefully  from the testicle with electrocautery. There was no scrotal violation. The spermatic cord was brought proximally until the internal ring was identified.    The cord was divided in two with the vas in one bundle and the remaining cord contents in the other. Two Aruna clamps were placed across the sectioned cord. The vas and cord were cut and removed from the table. A 2-0 silk stick tie as well as a 0-0 free tie was used to ligate the each section of the cord. One free silk tie was placed around the vas leaving the tag long. Hemostasis was observed.  The proximal spermatic cord was then delivered into the peritoneum through the internal ring.      The external oblique fascia was closed in a running fashion with 2-0 vicryl. The subcutaneous tissue was closed in 2 layers using a 3-0 vicryl and a 4-0 monocryl.  0.25% marcaine was used to anesthetize to skin with overlying steri strips.     The patient tolerated the procedure well and was transferred to the recovery room in stable condition.      Disposition:  The patient will follow up with Dr. Tyler in 2 weeks.  He was given prescriptions for norco.      Niko Greco MD

## 2020-03-03 NOTE — TRANSFER OF CARE
"Anesthesia Transfer of Care Note    Patient: Chao Chapman    Procedure(s) Performed: Procedure(s) (LRB):  ORCHIECTOMY (Right)    Patient location: [facility]    Anesthesia Type: general    Transport from OR: Transported from OR on 6-10 L/min O2 by face mask with adequate spontaneous ventilation    Post pain: adequate analgesia    Post assessment: no apparent anesthetic complications and tolerated procedure well    Post vital signs: stable    Level of consciousness: awake and alert    Nausea/Vomiting: no nausea/vomiting    Complications: none    Transfer of care protocol was followed      Last vitals:   Visit Vitals  BP (!) 148/73 (BP Location: Left arm, Patient Position: Lying)   Pulse (P) 86   Temp 36.6 °C (97.9 °F) (Oral)   Resp (P) 17   Ht 5' 7" (1.702 m)   Wt 74.8 kg (165 lb)   SpO2 (!) (P) 94%   BMI 25.84 kg/m²     "

## 2020-03-03 NOTE — DISCHARGE SUMMARY
[facility]  Discharge Note  Short Stay    Admit Date: 3/3/2020    Discharge Date and Time: 03/03/2020 12:46 PM      Attending Physician: Harjit Tyler MD     Discharge Provider: Niko Greco    Diagnoses:  Active Hospital Problems    Diagnosis  POA    *Testicular mass [N50.89]  Yes    Crohn disease [K50.90]  Yes                Discharged Condition: good    Hospital Course: Patient was admitted for right radical orchiectomy and tolerated the procedure well with no complications. The patient was discharged home in good condition on the same day.       Final Diagnoses: Same as principal problem.    Disposition: Home or Self Care    Follow up/Patient Instructions:    Medications:  Reconciled Home Medications:   Current Discharge Medication List      START taking these medications    Details   HYDROcodone-acetaminophen (NORCO) 5-325 mg per tablet Take 1 tablet by mouth every 6 (six) hours as needed for Pain.  Qty: 11 tablet, Refills: 0    Comments: Quantity prescribed more than 7 day supply? No         CONTINUE these medications which have NOT CHANGED    Details   INFLIXIMAB (REMICADE IV) Inject into the vein every 8 weeks.            Discharge Procedure Orders   Call MD for:  persistent nausea and vomiting or diarrhea     Call MD for:  severe uncontrolled pain     Call MD for:  persistent dizziness, light-headedness, or visual disturbances     Call MD for:   Order Comments: Temperature > 101F     Follow-up Information     Harjit Tyler MD In 2 weeks.    Specialty:  Urology  Why:  post op with path review  Contact information:  [address] [facility] [id number] [phone]                   Discharge Procedure Orders (must include Diet, Follow-up, Activity):   Discharge Procedure Orders (must include Diet, Follow-up, Activity)   Call MD for:  persistent nausea and vomiting or diarrhea     Call MD for:  severe uncontrolled pain     Call MD for:  persistent dizziness, light-headedness, or visual  disturbances     Call MD for:   Order Comments: Temperature > 101F

## 2020-03-03 NOTE — PROGRESS NOTES
I notified Dr. Shipley with anesthesia about patients elevated blood pressure, bradycardia, and los SPO2 on room air. Pt is still very drowsy from sedation and had  norco 5 mg for pain already, no IV drugs used in recovery. MD said she would put in hydralazine for bp management and to continue monitoring.

## 2020-03-03 NOTE — INTERVAL H&P NOTE
The patient has been examined and the H&P has been reviewed:    I concur with the findings and no changes have occurred since H&P was written.    Anesthesia/Surgery risks, benefits and alternative options discussed and understood by patient/family.          Active Hospital Problems    Diagnosis  POA    Testicular mass [N50.89]  Yes      Resolved Hospital Problems   No resolved problems to display.

## 2020-03-03 NOTE — ANESTHESIA PREPROCEDURE EVALUATION
2020  Chao Chapman is a 49 y.o., male.  Pre-operative evaluation for ORCHIECTOMY (Right)    Chief Complaint: testicular mass    PMH:  Crohn's disease   smoker  Past Surgical History:   Procedure Laterality Date    COLONOSCOPY      COLONOSCOPY N/A 2016    Procedure: COLONOSCOPY;  Surgeon: Danika Saunders MD;  Location: [facility];  Service: Endoscopy;  Laterality: N/A;    COLONOSCOPY N/A 2019    Procedure: COLONOSCOPY;  Surgeon: Melecio Saunders MD;  Location: [facility];  Service: Endoscopy;  Laterality: N/A;    ILEOSTOMY      ILEOSTOMY CLOSURE  2016    SKIN BIOPSY      SMALL INTESTINE SURGERY      UPPER GASTROINTESTINAL ENDOSCOPY           Vital Signs Range (Last 24H):         CBC:   No results for input(s): WBC, RBC, HGB, HCT, PLT, MCV, MCH, MCHC in the last 720 hours.    CMP:   Recent Labs   Lab 20  1615   CREATININE 1.0       INR:  No results for input(s): PT, INR, PROTIME, APTT in the last 720 hours.      Diagnostic Studies:      EKD Echo:  Anesthesia Evaluation         Review of Systems         Anesthesia Plan  Type of Anesthesia, risks & benefits discussed:  Anesthesia Type:  general  Patient's Preference:   Intra-op Monitoring Plan: standard ASA monitors  Intra-op Monitoring Plan Comments:   Post Op Pain Control Plan:   Post Op Pain Control Plan Comments:   Induction:   IV  Beta Blocker:  Patient is not currently on a Beta-Blocker (No further documentation required).       Informed Consent: Patient understands risks and agrees with Anesthesia plan.  Questions answered. Anesthesia consent signed with patient.  ASA Score: 2     Day of Surgery Review of History & Physical:    H&P update referred to the surgeon.         Ready For Surgery From Anesthesia Perspective.

## 2020-03-04 VITALS
RESPIRATION RATE: 17 BRPM | TEMPERATURE: 97 F | OXYGEN SATURATION: 95 % | SYSTOLIC BLOOD PRESSURE: 151 MMHG | BODY MASS INDEX: 25.9 KG/M2 | WEIGHT: 165 LBS | HEART RATE: 56 BPM | DIASTOLIC BLOOD PRESSURE: 80 MMHG | HEIGHT: 67 IN

## 2020-03-09 LAB
FINAL PATHOLOGIC DIAGNOSIS: NORMAL
GROSS: NORMAL
Lab: NORMAL
MICROSCOPIC EXAM: NORMAL

## 2020-03-16 ENCOUNTER — PATIENT MESSAGE (OUTPATIENT)
Dept: UROLOGY | Facility: CLINIC | Age: 50
End: 2020-03-16

## 2020-03-18 ENCOUNTER — OFFICE VISIT (OUTPATIENT)
Dept: UROLOGY | Facility: CLINIC | Age: 50
End: 2020-03-18
Payer: COMMERCIAL

## 2020-03-18 DIAGNOSIS — C62.11 MALIGNANT NEOPLASM OF DESCENDED RIGHT TESTIS: Primary | ICD-10-CM

## 2020-03-18 PROCEDURE — 99024 POSTOP FOLLOW-UP VISIT: CPT | Mod: 95,,, | Performed by: UROLOGY

## 2020-03-18 PROCEDURE — 99024 PR POST-OP FOLLOW-UP VISIT: ICD-10-PCS | Mod: 95,,, | Performed by: UROLOGY

## 2020-03-18 NOTE — PROGRESS NOTES
The patient location is: [place]  The chief complaint leading to consultation is: testicular ca  Visit type: Virtual visit with synchronous audio and video  Total time spent with patient: 13 min  Each patient to whom he or she provides medical services by telemedicine is:  (1) informed of the relationship between the physician and patient and the respective role of any other health care provider with respect to management of the patient; and (2) notified that he or she may decline to receive medical services by telemedicine and may withdraw from such care at any time.    Notes: Mr. Chapman is a 49 y.o. who noticed R testicular swelling > 4 months ago.  Originally grew in size, but it has stabilized over the past month.  He presented to his PCP who diagnosed with him orchitis.  A scrotal u/s was done that was c/w orchitis.  Was given a 1 month course of doxy with no change in the swelling.  He then underwent a repeat scrotal u/s showing no change.  I reviewed his films personally.  No pain.  Exam was concerning for a neoplasm.  On 3/3/20, he underwent R orchiectomy.  Path returned seminoma.  Was pT1b.  Pre-op HCG was mildly elevated.    He has LUTS.  Nocturia x 1.  Good FOS.  No hematuria.  No dysuria.    He denies ED.      ROS:  Chao Chapman denies chest pain, sore throat, SOB, fever/chills, N/V.  The remainder of the ROS is negative except what is in the HPI.    Assessment:  Encounter Diagnosis   Name Primary?    Malignant neoplasm of descended right testis Yes       Plan:  1. Went over his path.  2. Will consult oncology.

## 2020-03-25 ENCOUNTER — TELEPHONE (OUTPATIENT)
Dept: HEMATOLOGY/ONCOLOGY | Facility: CLINIC | Age: 50
End: 2020-03-25

## 2020-03-25 PROBLEM — C62.91 SEMINOMA OF RIGHT TESTIS: Status: ACTIVE | Noted: 2020-03-25

## 2020-03-25 NOTE — PROGRESS NOTES
PATIENT: Chao Chapman  MRN: [id number]  DATE: 3/25/2020    Diagnosis:   1. Seminoma of right testis    2. Crohn's disease of small intestine with other complication    3. Advance care planning      Chief Complaint: testicular cancer    Oncologic History:      Oncologic History 1. Seminoma of the right testicle - Stage IA (pT1b, pN0, cM0, S0)      Oncologic Treatment 1. Right orchiectomy (3/3/20)  2. Surveillance      Pathology 3/3/20:  Right testicle, orchiectomy:  -Seminoma (4.5 cm in maximum dimension)  -Tumor is limited to the testis  -Negative for lymphovascular invasion  -No lymph nodes submitted or found  -Pathologic stage classification: pT1b  Synoptic report: TESTIS: Radical orchiectomy  Specimen laterality: Right  Tumor focality: Unifocal  Tumor size: 4.5 x 3.8 x 3.5 cm  Histologic type: Seminoma  Tumor extension: Tumor limited to testis  Spermatic cord margin: Uninvolved by tumor  Lymphovascular invasion: Not identified  Regional lymph nodes: No lymph nodes submitted or found  Pathologicp stage classification (TNM):  Primary tumor (pT): pT1b: Tumor 3 cm or larger in size  Regional lymph nodes (pN): pNX: Regional lymph nodes cannot be assessed        Subjective:    History of Present Illness: Mr. Chapman is a 49 y.o. male who presents for evaluation and management of seminoma of right testicle. He is referred by urologist Dr. Tyler.    Telemedicine visit  The patient location is: home  The chief complaint leading to consultation is: testicular cancer  Visit type: Virtual visit with synchronous audio and video  Total time spent with patient: 30 minutes  Each patient to whom he or she provides medical services by telemedicine is:  (1) informed of the relationship between the physician and patient and the respective role of any other health care provider with respect to management of the patient; and (2) notified that he or she may decline to receive medical services by telemedicine and may withdraw from  such care at any time.    Notes: see below    - presenting symptom was a right testicular mass.  - Ultrasound and CT imaging confirmed the presence of a testicular mass.  - 3/3/20: he underwent a right orchiectomy. Pathology revealed a pure seminoma.  - today, he is doing well. He denies shortness of breath, chest pain, nausea, vomiting, diarrhea, constipation.  - he states that his Crohn's disease is in remission.    Past medical, surgical, family, and social histories have been reviewed and updated below.    Past Medical History:   Past Medical History:   Diagnosis Date    Abscess of groin, right     Abscess of right thigh     Anemia     Colonic fistula     Crohn's disease     History of abdominal abscess     Perforated bowel     Severe sepsis        Past Surgical History:   Past Surgical History:   Procedure Laterality Date    COLONOSCOPY      COLONOSCOPY N/A 11/29/2016    Procedure: COLONOSCOPY;  Surgeon: Danika Saunders MD;  Location: [facility];  Service: Endoscopy;  Laterality: N/A;    COLONOSCOPY N/A 8/29/2019    Procedure: COLONOSCOPY;  Surgeon: Melecio Saunders MD;  Location: [facility];  Service: Endoscopy;  Laterality: N/A;    ILEOSTOMY      ILEOSTOMY CLOSURE  07/22/2016    ORCHIECTOMY Right 3/3/2020    Procedure: ORCHIECTOMY;  Surgeon: Harjit Tyler MD;  Location: [address];  Service: Urology;  Laterality: Right;  1.5hr    SKIN BIOPSY      SMALL INTESTINE SURGERY      UPPER GASTROINTESTINAL ENDOSCOPY         Family History:   Family History   Problem Relation Age of Onset    Diabetes Maternal Grandmother        Social History:  reports that he has been smoking cigarettes. He has been smoking about 0.50 packs per day. He has never used smokeless tobacco. He reports that he has current or past drug history. Drug: Marijuana. He reports that he does not drink alcohol.    Allergies:  Review of patient's allergies indicates:  No Known Allergies    Medications:  Current  Outpatient Medications   Medication Sig Dispense Refill    INFLIXIMAB (REMICADE IV) Inject into the vein every 8 weeks.        No current facility-administered medications for this visit.        Review of Systems   Constitutional: Negative for fatigue.   HENT: Negative for sore throat.    Respiratory: Negative for shortness of breath.    Cardiovascular: Negative for chest pain.   Gastrointestinal: Negative for abdominal pain.   Genitourinary: Negative for dysuria.   Musculoskeletal: Negative for back pain.   Skin: Negative for rash.   Neurological: Negative for headaches.   Hematological: Negative for adenopathy.   Psychiatric/Behavioral: The patient is not nervous/anxious.        ECOG Performance Status:   ECOG SCORE 0          Objective:      Vitals: There were no vitals filed for this visit.  BMI: There is no height or weight on file to calculate BMI.  Deferred since telemedicine visit.    Physical Exam  Deferred since telemedicine visit.    Laboratory Data:  Labs have been reviewed.    Lab Results   Component Value Date    WBC 9.18 05/24/2019    HGB 14.4 05/24/2019    HCT 43.0 05/24/2019    MCV 86 05/24/2019     05/24/2019                 Imaging:     CT chest/abdomen/pelvis (3/2/20): I have personally reviewed the images  - In this patient with history of testicular neoplasm, right testicular mass is visualized.  No evidence of metastatic disease, noting small inguinal and periaortic lymph nodes without definite pathologic adenopathy.  - Postoperative change of the terminal ileum, as well as small area of more linear soft tissue density adjacent to the cecum.  This may represent sequela of prior infection as patient with known abscess in this region, with other considerations including postoperative change.    Assessment:       1. Seminoma of right testis    2. Crohn's disease of small intestine with other complication    3. Advance care planning         Plan:     1. Seminoma of right testicle - Stage  IA (pT1b, pN0, cM0, S0)    - I have reviewed his chart  - 3/3/20: he underwent a right orchiectomy. Pathology revealed a pure seminoma.  - given his stage and this being a seminoma, I recommend surveillance, which is strongly preferred in the National [employer] guidelines (see below).    - for surveillance, I will bring him back in June 2020 with repeat CT imaging.    - return to clinic in June 2020 with repeat CT scan.    2. Advance Care Planning     Power of   I initiated the process of advance care planning today and explained the importance of this process to the patient.  I introduced the concept of advance directives to the patient, as well. Then the patient received detailed information about the importance of designating a Health Care Power of  (HCPOA). He was also instructed to communicate with this person about their wishes for future healthcare, should he become sick and lose decision-making capacity. The patient has not previously appointed a HCPOA. After our discussion, the patient has decided to complete a HCPOA and has appointed his Soledad Chapman ([phone]).        - return to clinic in June 2020 with repeat CT scan.    London Altamirano M.D.  Hematology/Oncology  [facility] - [address], [address]  [place] [address]  Phone: [phone]  Fax: [phone]

## 2020-03-26 ENCOUNTER — OFFICE VISIT (OUTPATIENT)
Dept: HEMATOLOGY/ONCOLOGY | Facility: CLINIC | Age: 50
End: 2020-03-26
Payer: COMMERCIAL

## 2020-03-26 ENCOUNTER — TELEPHONE (OUTPATIENT)
Dept: HEMATOLOGY/ONCOLOGY | Facility: CLINIC | Age: 50
End: 2020-03-26

## 2020-03-26 ENCOUNTER — PATIENT MESSAGE (OUTPATIENT)
Dept: HEMATOLOGY/ONCOLOGY | Facility: CLINIC | Age: 50
End: 2020-03-26

## 2020-03-26 DIAGNOSIS — C62.91 SEMINOMA OF RIGHT TESTIS: Primary | ICD-10-CM

## 2020-03-26 DIAGNOSIS — K50.018 CROHN'S DISEASE OF SMALL INTESTINE WITH OTHER COMPLICATION: ICD-10-CM

## 2020-03-26 DIAGNOSIS — Z71.89 ADVANCE CARE PLANNING: ICD-10-CM

## 2020-03-26 DIAGNOSIS — C62.11 MALIGNANT NEOPLASM OF DESCENDED RIGHT TESTIS: ICD-10-CM

## 2020-03-26 PROCEDURE — 99205 OFFICE O/P NEW HI 60 MIN: CPT | Mod: 95,,, | Performed by: INTERNAL MEDICINE

## 2020-03-26 PROCEDURE — 99205 PR OFFICE/OUTPT VISIT, NEW, LEVL V, 60-74 MIN: ICD-10-PCS | Mod: 95,,, | Performed by: INTERNAL MEDICINE

## 2020-03-26 NOTE — Clinical Note
Good afternoon, I have a 49 year-old male with resected stage I pure seminoma. I am thinking about just surveillance, but [facility] also offers single-agent carboplatin for 1-2 cycles or radiation therapy. Just wanted to see your thoughts (since I only see a few testicular cancers each year). Thanks so much and hope you're doing well!London

## 2020-03-26 NOTE — TELEPHONE ENCOUNTER
Pt. Confirmed lab appt. At 8am, and CT scan at 8:45am on 6/17/20 and video visit with Dr. Altamirano at 10:30am on 6/18/20.  ----- Message from London Altamirano MD sent at 3/26/2020  1:19 PM CDT -----  Regarding: f/u  Good afternoon,    Just did telemedicine visit with him.  1. Schedule f/u labs CBC, CMP, AFP, LDH, beta hCG and CT abdomen/pelvis in June 2020.  2. Schedule follow-up appointment a day or so after the labs/scan.    Thanks!  London

## 2020-03-26 NOTE — LETTER
March 26, 2020      Harjit Tyler MD  [address]  [facility] [address]           [place] [facility]ology [facility]cology  [address], [facility] [address]  [facility] [address]  Phone: [phone]          Patient: Chao Chapman   MR Number: [id number]   YOB: 1970   Date of Visit: 3/26/2020       Dear Dr. Harjit Tyler:    Thank you for referring Chao Chapman to me for evaluation. Attached you will find relevant portions of my assessment and plan of care.    If you have questions, please do not hesitate to call me. I look forward to following Chao Chapman along with you.    Sincerely,    London lAtamirano MD    Enclosure  CC:  No Recipients    If you would like to receive this communication electronically, please contact [email or web address] or [phone] to request more information on [employer] access.    For providers and/or their staff who would like to refer a patient to [facility], please contact us through our one-stop-shop provider referral line, [facility], at [phone].    If you feel you have received this communication in error or would no longer like to receive these types of communications, please e-mail [email or web address]

## 2020-06-17 ENCOUNTER — LAB VISIT (OUTPATIENT)
Dept: LAB | Facility: HOSPITAL | Age: 50
End: 2020-06-17
Attending: INTERNAL MEDICINE
Payer: COMMERCIAL

## 2020-06-17 ENCOUNTER — HOSPITAL ENCOUNTER (OUTPATIENT)
Dept: RADIOLOGY | Facility: HOSPITAL | Age: 50
Discharge: HOME | End: 2020-06-17
Attending: INTERNAL MEDICINE
Payer: COMMERCIAL

## 2020-06-17 DIAGNOSIS — C62.91 SEMINOMA OF RIGHT TESTIS: ICD-10-CM

## 2020-06-17 LAB
AFP SERPL-MCNC: 2.2 NG/ML (ref 0–8.4)
ALBUMIN SERPL BCP-MCNC: 3.6 G/DL (ref 3.5–5.2)
ALP SERPL-CCNC: 103 U/L (ref 55–135)
ALT SERPL W/O P-5'-P-CCNC: 26 U/L (ref 10–44)
ANION GAP SERPL CALC-SCNC: 8 MMOL/L (ref 8–16)
AST SERPL-CCNC: 19 U/L (ref 10–40)
BASOPHILS # BLD AUTO: 0.09 K/UL (ref 0–0.2)
BASOPHILS NFR BLD: 0.6 % (ref 0–1.9)
BILIRUB SERPL-MCNC: 0.5 MG/DL (ref 0.1–1)
BUN SERPL-MCNC: 7 MG/DL (ref 6–20)
CALCIUM SERPL-MCNC: 9.2 MG/DL (ref 8.7–10.5)
CHLORIDE SERPL-SCNC: 107 MMOL/L (ref 95–110)
CO2 SERPL-SCNC: 27 MMOL/L (ref 23–29)
CREAT SERPL-MCNC: 1 MG/DL (ref 0.5–1.4)
DIFFERENTIAL METHOD: ABNORMAL
EOSINOPHIL # BLD AUTO: 0.3 K/UL (ref 0–0.5)
EOSINOPHIL NFR BLD: 2.1 % (ref 0–8)
ERYTHROCYTE [DISTWIDTH] IN BLOOD BY AUTOMATED COUNT: 14.4 % (ref 11.5–14.5)
EST. GFR  (AFRICAN AMERICAN): >60 ML/MIN/1.73 M^2
EST. GFR  (NON AFRICAN AMERICAN): >60 ML/MIN/1.73 M^2
GLUCOSE SERPL-MCNC: 84 MG/DL (ref 70–110)
HCG INTACT+B SERPL-ACNC: <1.2 MIU/ML
HCT VFR BLD AUTO: 49.8 % (ref 40–54)
HGB BLD-MCNC: 16.4 G/DL (ref 14–18)
IMM GRANULOCYTES # BLD AUTO: 0.04 K/UL (ref 0–0.04)
IMM GRANULOCYTES NFR BLD AUTO: 0.3 % (ref 0–0.5)
LDH SERPL L TO P-CCNC: 185 U/L (ref 110–260)
LYMPHOCYTES # BLD AUTO: 4.9 K/UL (ref 1–4.8)
LYMPHOCYTES NFR BLD: 33.4 % (ref 18–48)
MCH RBC QN AUTO: 28.7 PG (ref 27–31)
MCHC RBC AUTO-ENTMCNC: 32.9 G/DL (ref 32–36)
MCV RBC AUTO: 87 FL (ref 82–98)
MONOCYTES # BLD AUTO: 1.2 K/UL (ref 0.3–1)
MONOCYTES NFR BLD: 8.5 % (ref 4–15)
NEUTROPHILS # BLD AUTO: 8 K/UL (ref 1.8–7.7)
NEUTROPHILS NFR BLD: 55.1 % (ref 38–73)
NRBC BLD-RTO: 0 /100 WBC
PLATELET # BLD AUTO: 254 K/UL (ref 150–350)
PMV BLD AUTO: 11.1 FL (ref 9.2–12.9)
POTASSIUM SERPL-SCNC: 3.7 MMOL/L (ref 3.5–5.1)
PROT SERPL-MCNC: 7.5 G/DL (ref 6–8.4)
RBC # BLD AUTO: 5.72 M/UL (ref 4.6–6.2)
SODIUM SERPL-SCNC: 142 MMOL/L (ref 136–145)
WBC # BLD AUTO: 14.57 K/UL (ref 3.9–12.7)

## 2020-06-17 PROCEDURE — 82105 ALPHA-FETOPROTEIN SERUM: CPT

## 2020-06-17 PROCEDURE — 83615 LACTATE (LD) (LDH) ENZYME: CPT

## 2020-06-17 PROCEDURE — 74177 CT ABD & PELVIS W/CONTRAST: CPT | Mod: TC

## 2020-06-17 PROCEDURE — 74177 CT ABD & PELVIS W/CONTRAST: CPT | Mod: 26,,, | Performed by: RADIOLOGY

## 2020-06-17 PROCEDURE — 84702 CHORIONIC GONADOTROPIN TEST: CPT

## 2020-06-17 PROCEDURE — 80053 COMPREHEN METABOLIC PANEL: CPT

## 2020-06-17 PROCEDURE — 25500020 PHARM REV CODE 255: Performed by: INTERNAL MEDICINE

## 2020-06-17 PROCEDURE — 74177 CT ABDOMEN PELVIS WITH CONTRAST: ICD-10-PCS | Mod: 26,,, | Performed by: RADIOLOGY

## 2020-06-17 PROCEDURE — 36415 COLL VENOUS BLD VENIPUNCTURE: CPT

## 2020-06-17 PROCEDURE — 85025 COMPLETE CBC W/AUTO DIFF WBC: CPT

## 2020-06-17 RX ADMIN — IOHEXOL 15 ML: 350 INJECTION, SOLUTION INTRAVENOUS at 08:06

## 2020-06-17 RX ADMIN — IOHEXOL 75 ML: 350 INJECTION, SOLUTION INTRAVENOUS at 09:06

## 2020-06-17 NOTE — PROGRESS NOTES
PATIENT: Chao Chapman  MRN: [id number]  DATE: 6/17/2020    Diagnosis:   1. Seminoma of right testis    2. Crohn's disease of small intestine with abscess      Chief Complaint: testicular cancer    Oncologic History:      Oncologic History 1. Seminoma of the right testicle - Stage IA (pT1b, pN0, cM0, S0)      Oncologic Treatment 1. Right orchiectomy (3/3/20)  2. Surveillance      Pathology 3/3/20:  Right testicle, orchiectomy:  -Seminoma (4.5 cm in maximum dimension)  -Tumor is limited to the testis  -Negative for lymphovascular invasion  -No lymph nodes submitted or found  -Pathologic stage classification: pT1b  Synoptic report: TESTIS: Radical orchiectomy  Specimen laterality: Right  Tumor focality: Unifocal  Tumor size: 4.5 x 3.8 x 3.5 cm  Histologic type: Seminoma  Tumor extension: Tumor limited to testis  Spermatic cord margin: Uninvolved by tumor  Lymphovascular invasion: Not identified  Regional lymph nodes: No lymph nodes submitted or found  Pathologicp stage classification (TNM):  Primary tumor (pT): pT1b: Tumor 3 cm or larger in size  Regional lymph nodes (pN): pNX: Regional lymph nodes cannot be assessed        Subjective:    History of Present Illness: Mr. Chapman is a 49 y.o. male who presented in March 2020 for evaluation and management of seminoma of right testicle. He was referred by urologist Dr. Tyler.    Telemedicine visit  The patient location is: home  The chief complaint leading to consultation is: testicular cancer    Visit type: audiovisual    Face to Face time with patient: 15 minutes  25 minutes of total time spent on the encounter, which includes face to face time and non-face to face time preparing to see the patient (eg, review of tests), Obtaining and/or reviewing separately obtained history, Documenting clinical information in the electronic or other health record, Independently interpreting results (not separately reported) and communicating results to the patient/family/caregiver,  or Care coordination (not separately reported).     Each patient to whom he or she provides medical services by telemedicine is:  (1) informed of the relationship between the physician and patient and the respective role of any other health care provider with respect to management of the patient; and (2) notified that he or she may decline to receive medical services by telemedicine and may withdraw from such care at any time.    Notes: see below      - presenting symptom was a right testicular mass.  - Ultrasound and CT imaging confirmed the presence of a testicular mass.  - 3/3/20: he underwent a right orchiectomy. Pathology revealed a pure seminoma.  - he states that his Crohn's disease is in remission.    Interval history:  - he presents for a follow-up appointment for his testicular cancer.  - he underwent labs/CT scan on 6/17/20.  - today, he is doing well. He denies shortness of breath, chest pain, nausea, vomiting, diarrhea, constipation.    Past medical, surgical, family, and social histories have been reviewed and updated below.    Past Medical History:   Past Medical History:   Diagnosis Date    Abscess of groin, right     Abscess of right thigh     Anemia     Colonic fistula     Crohn's disease     History of abdominal abscess     Perforated bowel     Severe sepsis        Past Surgical History:   Past Surgical History:   Procedure Laterality Date    COLONOSCOPY      COLONOSCOPY N/A 11/29/2016    Procedure: COLONOSCOPY;  Surgeon: Danika Saunders MD;  Location: [facility];  Service: Endoscopy;  Laterality: N/A;    COLONOSCOPY N/A 8/29/2019    Procedure: COLONOSCOPY;  Surgeon: Melecio Saunders MD;  Location: [facility];  Service: Endoscopy;  Laterality: N/A;    ILEOSTOMY      ILEOSTOMY CLOSURE  07/22/2016    ORCHIECTOMY Right 3/3/2020    Procedure: ORCHIECTOMY;  Surgeon: Harjit Tyler MD;  Location: [address];  Service: Urology;  Laterality: Right;  1.5hr    SKIN BIOPSY       SMALL INTESTINE SURGERY      UPPER GASTROINTESTINAL ENDOSCOPY         Family History:   Family History   Problem Relation Age of Onset    Diabetes Maternal Grandmother        Social History:  reports that he has been smoking cigarettes. He has been smoking about 0.50 packs per day. He has never used smokeless tobacco. He reports current drug use. Drug: Marijuana. He reports that he does not drink alcohol.    Allergies:  Review of patient's allergies indicates:  No Known Allergies    Medications:  Current Outpatient Medications   Medication Sig Dispense Refill    INFLIXIMAB (REMICADE IV) Inject into the vein every 8 weeks.        No current facility-administered medications for this visit.        Review of Systems   Constitutional: Negative for fatigue.   HENT: Negative for sore throat.    Respiratory: Negative for shortness of breath.    Cardiovascular: Negative for chest pain.   Gastrointestinal: Negative for abdominal pain.   Genitourinary: Negative for dysuria.   Musculoskeletal: Negative for back pain.   Skin: Negative for rash.   Neurological: Negative for headaches.   Hematological: Negative for adenopathy.   Psychiatric/Behavioral: The patient is not nervous/anxious.        ECOG Performance Status:   ECOG SCORE 0       Objective:      Vitals: There were no vitals filed for this visit.  BMI: There is no height or weight on file to calculate BMI.  Deferred since telemedicine visit.    Physical Exam  Deferred since telemedicine visit.    Laboratory Data:  Labs have been reviewed.    Lab Results   Component Value Date    WBC 9.18 05/24/2019    HGB 14.4 05/24/2019    HCT 43.0 05/24/2019    MCV 86 05/24/2019     05/24/2019         Imaging:     CT abdomen/pelvis (6/17/20): I have personally reviewed the images  - In this patient with history of testicular cancer with interval postoperative change from right-sided orchiectomy, there is no evidence of metastatic disease.  - Stable postoperative changes near  the terminal ileum with a stable soft tissue density adjacent to the cecum.  Questionable 2 cm collection anterior to the right iliopsoas muscle may represent sequelae of prior infection though a small recurrent abscess is not excluded.  Recommend clinical correlation.  - Short segments of wall thickening in the distal descending and sigmoid colon.  This may represent normal peristalsis.  Review of the electronic medical record indicates that patient underwent recent colonoscopy on 08/29/2019 that identified no significant abnormality throughout the entire exam portion of the colon.    CT chest/abdomen/pelvis (3/2/20): I have personally reviewed the images  - In this patient with history of testicular neoplasm, right testicular mass is visualized.  No evidence of metastatic disease, noting small inguinal and periaortic lymph nodes without definite pathologic adenopathy.  - Postoperative change of the terminal ileum, as well as small area of more linear soft tissue density adjacent to the cecum.  This may represent sequela of prior infection as patient with known abscess in this region, with other considerations including postoperative change.    Assessment:       1. Seminoma of right testis    2. Crohn's disease of small intestine with abscess         Plan:     1. Seminoma of right testicle - Stage IA (pT1b, pN0, cM0, S0)  - I have reviewed his chart  - 3/3/20: he underwent a right orchiectomy. Pathology revealed a pure seminoma.  - given his stage and this being a seminoma, I recommend surveillance, which is strongly preferred in the National Comprehensive Cancer Network guidelines (see below).    - for surveillance, I will bring him back in June 2020 with repeat CT imaging.    - CT abdomen/pelvis (6/17/20) was negative for recurrent/metastatic disease. Tumor markers were within normal limits.  - return to clinic in 3 months with repeat labs/scan.    2. Advance Care Planning     Power of   I initiated the  process of advance care planning today and explained the importance of this process to the patient.  I introduced the concept of advance directives to the patient, as well. Then the patient received detailed information about the importance of designating a Health Care Power of  (HCPOA). He was also instructed to communicate with this person about their wishes for future healthcare, should he become sick and lose decision-making capacity. The patient has not previously appointed a HCPOA. After our discussion, the patient has decided to complete a HCPOA and has appointed his Soledad Chapman ([phone]).        - return to clinic in 3 months with repeat labs/scan.    London Altamirano M.D.  Hematology/Oncology  [facility] - [address], [address]  [place] [address]  Phone: [phone]  Fax: [phone]

## 2020-06-18 ENCOUNTER — OFFICE VISIT (OUTPATIENT)
Dept: HEMATOLOGY/ONCOLOGY | Facility: CLINIC | Age: 50
End: 2020-06-18
Payer: COMMERCIAL

## 2020-06-18 ENCOUNTER — TELEPHONE (OUTPATIENT)
Dept: HEMATOLOGY/ONCOLOGY | Facility: CLINIC | Age: 50
End: 2020-06-18

## 2020-06-18 DIAGNOSIS — C62.91 SEMINOMA OF RIGHT TESTIS: Primary | ICD-10-CM

## 2020-06-18 DIAGNOSIS — K50.014 CROHN'S DISEASE OF SMALL INTESTINE WITH ABSCESS: ICD-10-CM

## 2020-06-18 PROCEDURE — 99214 OFFICE O/P EST MOD 30 MIN: CPT | Mod: 95,,, | Performed by: INTERNAL MEDICINE

## 2020-06-18 PROCEDURE — 99214 PR OFFICE/OUTPT VISIT, EST, LEVL IV, 30-39 MIN: ICD-10-PCS | Mod: 95,,, | Performed by: INTERNAL MEDICINE

## 2020-06-18 NOTE — Clinical Note
1. Schedule labs CBC, CMP, AFP, beta-hCG, LDH and CT abdomen/pelvis in 3 months (mid September 2020).   2. Schedule f/u telemedicine visit the day after the labs/scan.    Thanks!

## 2020-06-18 NOTE — TELEPHONE ENCOUNTER
ALEC for pt. To confirm lab appt. and CT scan at 9am on 9/15/20, and virtual visit with Dr. Altamirano at 2pm on 9/16/20.

## 2020-09-15 ENCOUNTER — HOSPITAL ENCOUNTER (OUTPATIENT)
Dept: RADIOLOGY | Facility: HOSPITAL | Age: 50
Discharge: HOME | End: 2020-09-15
Attending: INTERNAL MEDICINE
Payer: COMMERCIAL

## 2020-09-15 DIAGNOSIS — C62.91 SEMINOMA OF RIGHT TESTIS: ICD-10-CM

## 2020-09-15 PROCEDURE — 74177 CT ABD & PELVIS W/CONTRAST: CPT | Mod: TC

## 2020-09-15 PROCEDURE — 25500020 PHARM REV CODE 255: Performed by: INTERNAL MEDICINE

## 2020-09-15 PROCEDURE — 74177 CT ABDOMEN PELVIS WITH CONTRAST: ICD-10-PCS | Mod: 26,,, | Performed by: RADIOLOGY

## 2020-09-15 PROCEDURE — 74177 CT ABD & PELVIS W/CONTRAST: CPT | Mod: 26,,, | Performed by: RADIOLOGY

## 2020-09-15 RX ADMIN — IOHEXOL 15 ML: 350 INJECTION, SOLUTION INTRAVENOUS at 01:09

## 2020-09-15 RX ADMIN — IOHEXOL 15 ML: 350 INJECTION, SOLUTION INTRAVENOUS at 02:09

## 2020-09-15 RX ADMIN — IOHEXOL 75 ML: 350 INJECTION, SOLUTION INTRAVENOUS at 02:09

## 2020-09-15 NOTE — PROGRESS NOTES
PATIENT: Chao Chapman  MRN: [id number]  DATE: 9/15/2020    Diagnosis:   1. Seminoma of right testis    2. Crohn's disease of small intestine with abscess      Chief Complaint: testicular cancer    Oncologic History:      Oncologic History 1. Seminoma of the right testicle - Stage IA (pT1b, pN0, cM0, S0)      Oncologic Treatment 1. Right orchiectomy (3/3/20)  2. Surveillance      Pathology 3/3/20:  Right testicle, orchiectomy:  -Seminoma (4.5 cm in maximum dimension)  -Tumor is limited to the testis  -Negative for lymphovascular invasion  -No lymph nodes submitted or found  -Pathologic stage classification: pT1b  Synoptic report: TESTIS: Radical orchiectomy  Specimen laterality: Right  Tumor focality: Unifocal  Tumor size: 4.5 x 3.8 x 3.5 cm  Histologic type: Seminoma  Tumor extension: Tumor limited to testis  Spermatic cord margin: Uninvolved by tumor  Lymphovascular invasion: Not identified  Regional lymph nodes: No lymph nodes submitted or found  Pathologicp stage classification (TNM):  Primary tumor (pT): pT1b: Tumor 3 cm or larger in size  Regional lymph nodes (pN): pNX: Regional lymph nodes cannot be assessed        Subjective:    History of Present Illness: Mr. Chapman is a 49 y.o. male who presented in March 2020 for evaluation and management of seminoma of right testicle. He was referred by urologist Dr. Tyler.    Telemedicine visit  The patient location is: home  The chief complaint leading to consultation is: testicular cancer    Visit type: audiovisual    Face to Face time with patient: 15 minutes  25 minutes of total time spent on the encounter, which includes face to face time and non-face to face time preparing to see the patient (eg, review of tests), Obtaining and/or reviewing separately obtained history, Documenting clinical information in the electronic or other health record, Independently interpreting results (not separately reported) and communicating results to the patient/family/caregiver,  or Care coordination (not separately reported).     Each patient to whom he or she provides medical services by telemedicine is:  (1) informed of the relationship between the physician and patient and the respective role of any other health care provider with respect to management of the patient; and (2) notified that he or she may decline to receive medical services by telemedicine and may withdraw from such care at any time.    Notes: see below      - presenting symptom was a right testicular mass.  - Ultrasound and CT imaging confirmed the presence of a testicular mass.  - 3/3/20: he underwent a right orchiectomy. Pathology revealed a pure seminoma.  - he states that his Crohn's disease is in remission.    Interval history:  - he presents for a follow-up appointment for his testicular cancer.  - he underwent labs/CT scan on 9/15/20.  - today, he is doing well. He denies shortness of breath, chest pain, nausea, vomiting, diarrhea, constipation.    Past medical, surgical, family, and social histories have been reviewed and updated below.    Past Medical History:   Past Medical History:   Diagnosis Date    Abscess of groin, right     Abscess of right thigh     Anemia     Colonic fistula     Crohn's disease     History of abdominal abscess     Perforated bowel     Severe sepsis        Past Surgical History:   Past Surgical History:   Procedure Laterality Date    COLONOSCOPY      COLONOSCOPY N/A 11/29/2016    Procedure: COLONOSCOPY;  Surgeon: Danika Saunders MD;  Location: [facility];  Service: Endoscopy;  Laterality: N/A;    COLONOSCOPY N/A 8/29/2019    Procedure: COLONOSCOPY;  Surgeon: Melecio Saunders MD;  Location: [facility];  Service: Endoscopy;  Laterality: N/A;    ILEOSTOMY      ILEOSTOMY CLOSURE  07/22/2016    ORCHIECTOMY Right 3/3/2020    Procedure: ORCHIECTOMY;  Surgeon: Harjit Tyler MD;  Location: [address];  Service: Urology;  Laterality: Right;  1.5hr    SKIN BIOPSY       SMALL INTESTINE SURGERY      UPPER GASTROINTESTINAL ENDOSCOPY         Family History:   Family History   Problem Relation Age of Onset    Diabetes Maternal Grandmother        Social History:  reports that he has been smoking cigarettes. He has been smoking about 0.50 packs per day. He has never used smokeless tobacco. He reports current drug use. Drug: Marijuana. He reports that he does not drink alcohol.    Allergies:  Review of patient's allergies indicates:  No Known Allergies    Medications:  Current Outpatient Medications   Medication Sig Dispense Refill    INFLIXIMAB (REMICADE IV) Inject into the vein every 8 weeks.        No current facility-administered medications for this visit.        Review of Systems   Constitutional: Negative for fatigue.   HENT: Negative for sore throat.    Respiratory: Negative for shortness of breath.    Cardiovascular: Negative for chest pain.   Gastrointestinal: Negative for abdominal pain.   Genitourinary: Negative for dysuria.   Musculoskeletal: Negative for back pain.   Skin: Negative for rash.   Neurological: Negative for headaches.   Hematological: Negative for adenopathy.   Psychiatric/Behavioral: The patient is not nervous/anxious.        ECOG Performance Status:   ECOG SCORE 0       Objective:      Vitals: There were no vitals filed for this visit.  BMI: There is no height or weight on file to calculate BMI.  Deferred since telemedicine visit.    Physical Exam  Deferred since telemedicine visit.    Laboratory Data:  Labs have been reviewed.    Lab Results   Component Value Date    WBC 10.35 09/15/2020    HGB 16.1 09/15/2020    HCT 47.9 09/15/2020    MCV 86 09/15/2020     09/15/2020       Imaging:     - CT abdomen/pelvis (9/15/20): I have personally reviewed the images  In this patient with history of testicular cancer, following findings are identified:     1. Postsurgical changes from right-sided okay ectomy.     2. No evidence of local disease recurrence or  metastatic disease.     Postsurgical changes at the terminal ileum with increased soft tissue density adjacent to this area which may represent sequela of prior infection in a patient with previous known abscess in this region.     2 tiny hypoattenuating lesions within the right hepatic lobe, too small to definitively characterize, however appear similar to prior examinations, and are statistically favored to be benign such as simple hepatic cyst versus hemangioma.     Hyperenhancing focus within anorectal junction, which may represent vascular malformation versus hemorrhoid.    CT abdomen/pelvis (6/17/20): I have personally reviewed the images  - In this patient with history of testicular cancer with interval postoperative change from right-sided orchiectomy, there is no evidence of metastatic disease.  - Stable postoperative changes near the terminal ileum with a stable soft tissue density adjacent to the cecum.  Questionable 2 cm collection anterior to the right iliopsoas muscle may represent sequelae of prior infection though a small recurrent abscess is not excluded.  Recommend clinical correlation.  - Short segments of wall thickening in the distal descending and sigmoid colon.  This may represent normal peristalsis.  Review of the electronic medical record indicates that patient underwent recent colonoscopy on 08/29/2019 that identified no significant abnormality throughout the entire exam portion of the colon.    CT chest/abdomen/pelvis (3/2/20): I have personally reviewed the images  - In this patient with history of testicular neoplasm, right testicular mass is visualized.  No evidence of metastatic disease, noting small inguinal and periaortic lymph nodes without definite pathologic adenopathy.  - Postoperative change of the terminal ileum, as well as small area of more linear soft tissue density adjacent to the cecum.  This may represent sequela of prior infection as patient with known abscess in this  region, with other considerations including postoperative change.    Assessment:       1. Seminoma of right testis    2. Crohn's disease of small intestine with abscess         Plan:     1. Seminoma of right testicle - Stage IA (pT1b, pN0, cM0, S0)  - I have reviewed his chart  - 3/3/20: he underwent a right orchiectomy. Pathology revealed a pure seminoma.  - given his stage and this being a seminoma, I recommend surveillance, which is strongly preferred in the National Comprehensive Cancer Network guidelines (see below).      - CT abdomen/pelvis (6/17/20) was negative for recurrent/metastatic disease. Tumor markers were within normal limits.   - CT abdomen/pelvis (9/15/20) was negative for recurrent/metastatic disease. Tumor markers were within normal limits.   - return to clinic in 6 months with repeat labs/scan.    2. Advance Care Planning     Power of   I initiated the process of advance care planning today and explained the importance of this process to the patient.  I introduced the concept of advance directives to the patient, as well. Then the patient received detailed information about the importance of designating a Health Care Power of  (HCPOA). He was also instructed to communicate with this person about their wishes for future healthcare, should he become sick and lose decision-making capacity. The patient has not previously appointed a HCPOA. After our discussion, the patient has decided to complete a HCPOA and has appointed his Soledad Chapman ([phone]).        - return to clinic in 6 months with repeat labs/scan.    London Altamirano M.D.  Hematology/Oncology  [facility] - [address], [address]  [place] [address]  Phone: [phone]  Fax: [phone]

## 2020-09-16 ENCOUNTER — TELEPHONE (OUTPATIENT)
Dept: HEMATOLOGY/ONCOLOGY | Facility: CLINIC | Age: 50
End: 2020-09-16

## 2020-09-16 ENCOUNTER — OFFICE VISIT (OUTPATIENT)
Dept: HEMATOLOGY/ONCOLOGY | Facility: CLINIC | Age: 50
End: 2020-09-16
Payer: COMMERCIAL

## 2020-09-16 DIAGNOSIS — C62.91 SEMINOMA OF RIGHT TESTIS: Primary | ICD-10-CM

## 2020-09-16 DIAGNOSIS — K50.014 CROHN'S DISEASE OF SMALL INTESTINE WITH ABSCESS: ICD-10-CM

## 2020-09-16 PROCEDURE — 99214 PR OFFICE/OUTPT VISIT, EST, LEVL IV, 30-39 MIN: ICD-10-PCS | Mod: 95,,, | Performed by: INTERNAL MEDICINE

## 2020-09-16 PROCEDURE — 99214 OFFICE O/P EST MOD 30 MIN: CPT | Mod: 95,,, | Performed by: INTERNAL MEDICINE

## 2020-09-16 NOTE — TELEPHONE ENCOUNTER
LM for pt. To confirm lab appt. At 9am and CT scan at 11am on 3/16/21 (at [facility]), and virtual visit with Dr. Altamirano at 1pm on 3/17/21.  Instructions given to fast 4 hours prior to CT appt. And to arrive at 10am for CT scan.       ----- Message from London Altamirano MD sent at 9/16/2020  2:07 PM CDT -----  1. Schedule labs CBC, CMP, LDH, AFP, beta-hcg and CT abdomen/pelvis in 6 months at [facility] (he requests same day for labs/scan).2. Schedule f/u virtual visit with me on day after labs/scan.Thanks!

## 2021-01-28 NOTE — Clinical Note
1. Schedule labs CBC, CMP, LDH, AFP, beta-hcg and CT abdomen/pelvis in 6 months at [facility] (he requests same day for labs/scan).  2. Schedule f/u virtual visit with me on day after labs/scan.    Thanks! normal... Well appearing, awake, alert, oriented to person, place, time/situation and in no apparent distress.

## 2021-03-15 ENCOUNTER — TELEPHONE (OUTPATIENT)
Dept: HEMATOLOGY/ONCOLOGY | Facility: CLINIC | Age: 51
End: 2021-03-15

## 2021-03-31 ENCOUNTER — HOSPITAL ENCOUNTER (OUTPATIENT)
Dept: RADIOLOGY | Facility: HOSPITAL | Age: 51
Discharge: HOME | End: 2021-03-31
Attending: INTERNAL MEDICINE
Payer: COMMERCIAL

## 2021-03-31 DIAGNOSIS — C62.91 SEMINOMA OF RIGHT TESTIS: ICD-10-CM

## 2021-03-31 PROCEDURE — 74177 CT ABDOMEN PELVIS WITH CONTRAST: ICD-10-PCS | Mod: 26,,, | Performed by: RADIOLOGY

## 2021-03-31 PROCEDURE — 74177 CT ABD & PELVIS W/CONTRAST: CPT | Mod: 26,,, | Performed by: RADIOLOGY

## 2021-03-31 PROCEDURE — 74177 CT ABD & PELVIS W/CONTRAST: CPT | Mod: TC

## 2021-03-31 PROCEDURE — 25500020 PHARM REV CODE 255: Performed by: INTERNAL MEDICINE

## 2021-03-31 RX ADMIN — IOHEXOL 15 ML: 350 INJECTION, SOLUTION INTRAVENOUS at 11:03

## 2021-03-31 RX ADMIN — IOHEXOL 75 ML: 350 INJECTION, SOLUTION INTRAVENOUS at 12:03

## 2021-04-01 ENCOUNTER — TELEPHONE (OUTPATIENT)
Dept: HEMATOLOGY/ONCOLOGY | Facility: CLINIC | Age: 51
End: 2021-04-01

## 2021-04-01 DIAGNOSIS — C62.91 SEMINOMA OF RIGHT TESTIS: Primary | ICD-10-CM

## 2021-05-04 ENCOUNTER — PATIENT MESSAGE (OUTPATIENT)
Dept: RESEARCH | Facility: HOSPITAL | Age: 51
End: 2021-05-04

## 2021-05-10 ENCOUNTER — PATIENT MESSAGE (OUTPATIENT)
Dept: RESEARCH | Facility: HOSPITAL | Age: 51
End: 2021-05-10

## 2021-09-10 RX ORDER — EPINEPHRINE 0.3 MG/.3ML
0.3 INJECTION SUBCUTANEOUS ONCE AS NEEDED
Status: CANCELLED | OUTPATIENT
Start: 2021-09-10

## 2021-09-10 RX ORDER — ACETAMINOPHEN 325 MG/1
650 TABLET ORAL
Status: CANCELLED | OUTPATIENT
Start: 2021-09-10

## 2021-09-10 RX ORDER — ACETAMINOPHEN 500 MG
1000 TABLET ORAL
Status: CANCELLED | OUTPATIENT
Start: 2021-09-10

## 2021-09-10 RX ORDER — IPRATROPIUM BROMIDE AND ALBUTEROL SULFATE 2.5; .5 MG/3ML; MG/3ML
3 SOLUTION RESPIRATORY (INHALATION)
Status: CANCELLED | OUTPATIENT
Start: 2021-09-10

## 2021-09-10 RX ORDER — SODIUM CHLORIDE 0.9 % (FLUSH) 0.9 %
10 SYRINGE (ML) INJECTION
Status: CANCELLED | OUTPATIENT
Start: 2021-09-10

## 2021-09-10 RX ORDER — HEPARIN 100 UNIT/ML
500 SYRINGE INTRAVENOUS
OUTPATIENT
Start: 2021-09-10

## 2021-09-10 RX ORDER — DIPHENHYDRAMINE HYDROCHLORIDE 50 MG/ML
50 INJECTION INTRAMUSCULAR; INTRAVENOUS ONCE AS NEEDED
Status: CANCELLED | OUTPATIENT
Start: 2021-09-10

## 2021-09-15 ENCOUNTER — TELEPHONE (OUTPATIENT)
Dept: GASTROENTEROLOGY | Facility: CLINIC | Age: 51
End: 2021-09-15

## 2021-09-20 ENCOUNTER — TELEPHONE (OUTPATIENT)
Dept: GASTROENTEROLOGY | Facility: CLINIC | Age: 51
End: 2021-09-20

## 2021-09-27 ENCOUNTER — TELEPHONE (OUTPATIENT)
Dept: GASTROENTEROLOGY | Facility: CLINIC | Age: 51
End: 2021-09-27

## 2021-09-30 ENCOUNTER — HOSPITAL ENCOUNTER (OUTPATIENT)
Dept: RADIOLOGY | Facility: HOSPITAL | Age: 51
Discharge: HOME | End: 2021-09-30
Attending: INTERNAL MEDICINE
Payer: COMMERCIAL

## 2021-09-30 DIAGNOSIS — C62.91 SEMINOMA OF RIGHT TESTIS: ICD-10-CM

## 2021-09-30 PROCEDURE — 74177 CT ABD & PELVIS W/CONTRAST: CPT | Mod: TC

## 2021-09-30 PROCEDURE — 74177 CT ABD & PELVIS W/CONTRAST: CPT | Mod: 26,,, | Performed by: RADIOLOGY

## 2021-09-30 PROCEDURE — 25500020 PHARM REV CODE 255: Performed by: INTERNAL MEDICINE

## 2021-09-30 PROCEDURE — 74177 CT ABDOMEN PELVIS WITH CONTRAST: ICD-10-PCS | Mod: 26,,, | Performed by: RADIOLOGY

## 2021-09-30 RX ADMIN — IOHEXOL 15 ML: 350 INJECTION, SOLUTION INTRAVENOUS at 12:09

## 2021-09-30 RX ADMIN — IOHEXOL 75 ML: 350 INJECTION, SOLUTION INTRAVENOUS at 12:09

## 2021-10-01 ENCOUNTER — TELEPHONE (OUTPATIENT)
Dept: HEMATOLOGY/ONCOLOGY | Facility: CLINIC | Age: 51
End: 2021-10-01

## 2021-10-01 ENCOUNTER — OFFICE VISIT (OUTPATIENT)
Dept: HEMATOLOGY/ONCOLOGY | Facility: CLINIC | Age: 51
End: 2021-10-01
Payer: COMMERCIAL

## 2021-10-01 ENCOUNTER — TELEPHONE (OUTPATIENT)
Dept: GASTROENTEROLOGY | Facility: CLINIC | Age: 51
End: 2021-10-01

## 2021-10-01 DIAGNOSIS — K50.018 CROHN'S DISEASE OF SMALL INTESTINE WITH OTHER COMPLICATION: ICD-10-CM

## 2021-10-01 DIAGNOSIS — C62.91 SEMINOMA OF RIGHT TESTIS: Primary | ICD-10-CM

## 2021-10-01 PROCEDURE — 1160F PR REVIEW ALL MEDS BY PRESCRIBER/CLIN PHARMACIST DOCUMENTED: ICD-10-PCS | Mod: CPTII,95,, | Performed by: INTERNAL MEDICINE

## 2021-10-01 PROCEDURE — 99214 PR OFFICE/OUTPT VISIT, EST, LEVL IV, 30-39 MIN: ICD-10-PCS | Mod: 95,,, | Performed by: INTERNAL MEDICINE

## 2021-10-01 PROCEDURE — 1159F MED LIST DOCD IN RCRD: CPT | Mod: CPTII,95,, | Performed by: INTERNAL MEDICINE

## 2021-10-01 PROCEDURE — 1160F RVW MEDS BY RX/DR IN RCRD: CPT | Mod: CPTII,95,, | Performed by: INTERNAL MEDICINE

## 2021-10-01 PROCEDURE — 99214 OFFICE O/P EST MOD 30 MIN: CPT | Mod: 95,,, | Performed by: INTERNAL MEDICINE

## 2021-10-01 PROCEDURE — 1159F PR MEDICATION LIST DOCUMENTED IN MEDICAL RECORD: ICD-10-PCS | Mod: CPTII,95,, | Performed by: INTERNAL MEDICINE

## 2021-10-06 ENCOUNTER — OFFICE VISIT (OUTPATIENT)
Dept: GASTROENTEROLOGY | Facility: CLINIC | Age: 51
End: 2021-10-06
Payer: COMMERCIAL

## 2021-10-06 VITALS
HEART RATE: 51 BPM | BODY MASS INDEX: 25.1 KG/M2 | TEMPERATURE: 99 F | WEIGHT: 160.25 LBS | SYSTOLIC BLOOD PRESSURE: 130 MMHG | OXYGEN SATURATION: 99 % | DIASTOLIC BLOOD PRESSURE: 79 MMHG

## 2021-10-06 DIAGNOSIS — K50.818 CROHN'S DISEASE OF BOTH SMALL AND LARGE INTESTINE WITH OTHER COMPLICATION: Primary | ICD-10-CM

## 2021-10-06 PROCEDURE — 3078F PR MOST RECENT DIASTOLIC BLOOD PRESSURE < 80 MM HG: ICD-10-PCS | Mod: CPTII,S$GLB,, | Performed by: INTERNAL MEDICINE

## 2021-10-06 PROCEDURE — 3075F SYST BP GE 130 - 139MM HG: CPT | Mod: CPTII,S$GLB,, | Performed by: INTERNAL MEDICINE

## 2021-10-06 PROCEDURE — 99203 PR OFFICE/OUTPT VISIT, NEW, LEVL III, 30-44 MIN: ICD-10-PCS | Mod: S$GLB,,, | Performed by: INTERNAL MEDICINE

## 2021-10-06 PROCEDURE — 3008F BODY MASS INDEX DOCD: CPT | Mod: CPTII,S$GLB,, | Performed by: INTERNAL MEDICINE

## 2021-10-06 PROCEDURE — 99203 OFFICE O/P NEW LOW 30 MIN: CPT | Mod: S$GLB,,, | Performed by: INTERNAL MEDICINE

## 2021-10-06 PROCEDURE — 3075F PR MOST RECENT SYSTOLIC BLOOD PRESS GE 130-139MM HG: ICD-10-PCS | Mod: CPTII,S$GLB,, | Performed by: INTERNAL MEDICINE

## 2021-10-06 PROCEDURE — 3078F DIAST BP <80 MM HG: CPT | Mod: CPTII,S$GLB,, | Performed by: INTERNAL MEDICINE

## 2021-10-06 PROCEDURE — 3008F PR BODY MASS INDEX (BMI) DOCUMENTED: ICD-10-PCS | Mod: CPTII,S$GLB,, | Performed by: INTERNAL MEDICINE

## 2021-12-14 ENCOUNTER — TELEPHONE (OUTPATIENT)
Dept: HEMATOLOGY/ONCOLOGY | Facility: CLINIC | Age: 51
End: 2021-12-14
Payer: COMMERCIAL

## 2022-01-27 ENCOUNTER — LAB VISIT (OUTPATIENT)
Dept: PRIMARY CARE CLINIC | Facility: CLINIC | Age: 52
End: 2022-01-27
Payer: COMMERCIAL

## 2022-01-27 DIAGNOSIS — Z20.822 CONTACT WITH AND (SUSPECTED) EXPOSURE TO COVID-19: ICD-10-CM

## 2022-01-27 LAB
CTP QC/QA: YES
SARS-COV-2 AG RESP QL IA.RAPID: NEGATIVE

## 2022-01-27 PROCEDURE — 87811 SARS-COV-2 COVID19 W/OPTIC: CPT

## 2022-06-01 ENCOUNTER — LAB VISIT (OUTPATIENT)
Dept: LAB | Facility: HOSPITAL | Age: 52
End: 2022-06-01
Attending: INTERNAL MEDICINE
Payer: COMMERCIAL

## 2022-06-01 DIAGNOSIS — C62.91 SEMINOMA OF RIGHT TESTIS: ICD-10-CM

## 2022-06-01 LAB
AFP SERPL-MCNC: <2 NG/ML (ref 0–8.4)
ALBUMIN SERPL BCP-MCNC: 3.7 G/DL (ref 3.5–5.2)
ALP SERPL-CCNC: 87 U/L (ref 55–135)
ALT SERPL W/O P-5'-P-CCNC: 17 U/L (ref 10–44)
ANION GAP SERPL CALC-SCNC: 7 MMOL/L (ref 8–16)
AST SERPL-CCNC: 19 U/L (ref 10–40)
BASOPHILS # BLD AUTO: 0.06 K/UL (ref 0–0.2)
BASOPHILS NFR BLD: 0.5 % (ref 0–1.9)
BILIRUB SERPL-MCNC: 0.7 MG/DL (ref 0.1–1)
BUN SERPL-MCNC: 11 MG/DL (ref 6–20)
CALCIUM SERPL-MCNC: 8.8 MG/DL (ref 8.7–10.5)
CHLORIDE SERPL-SCNC: 106 MMOL/L (ref 95–110)
CO2 SERPL-SCNC: 23 MMOL/L (ref 23–29)
CREAT SERPL-MCNC: 1 MG/DL (ref 0.5–1.4)
DIFFERENTIAL METHOD: NORMAL
EOSINOPHIL # BLD AUTO: 0.2 K/UL (ref 0–0.5)
EOSINOPHIL NFR BLD: 1.6 % (ref 0–8)
ERYTHROCYTE [DISTWIDTH] IN BLOOD BY AUTOMATED COUNT: 13.2 % (ref 11.5–14.5)
EST. GFR  (AFRICAN AMERICAN): >60 ML/MIN/1.73 M^2
EST. GFR  (NON AFRICAN AMERICAN): >60 ML/MIN/1.73 M^2
GLUCOSE SERPL-MCNC: 86 MG/DL (ref 70–110)
HCT VFR BLD AUTO: 46.7 % (ref 40–54)
HGB BLD-MCNC: 15.7 G/DL (ref 14–18)
IMM GRANULOCYTES # BLD AUTO: 0.03 K/UL (ref 0–0.04)
IMM GRANULOCYTES NFR BLD AUTO: 0.3 % (ref 0–0.5)
LDH SERPL L TO P-CCNC: 183 U/L (ref 110–260)
LYMPHOCYTES # BLD AUTO: 4.2 K/UL (ref 1–4.8)
LYMPHOCYTES NFR BLD: 37.9 % (ref 18–48)
MCH RBC QN AUTO: 29 PG (ref 27–31)
MCHC RBC AUTO-ENTMCNC: 33.6 G/DL (ref 32–36)
MCV RBC AUTO: 86 FL (ref 82–98)
MONOCYTES # BLD AUTO: 0.8 K/UL (ref 0.3–1)
MONOCYTES NFR BLD: 7.4 % (ref 4–15)
NEUTROPHILS # BLD AUTO: 5.8 K/UL (ref 1.8–7.7)
NEUTROPHILS NFR BLD: 52.3 % (ref 38–73)
NRBC BLD-RTO: 0 /100 WBC
PLATELET # BLD AUTO: 230 K/UL (ref 150–450)
PMV BLD AUTO: 11.5 FL (ref 9.2–12.9)
POTASSIUM SERPL-SCNC: 3.7 MMOL/L (ref 3.5–5.1)
PROT SERPL-MCNC: 7.6 G/DL (ref 6–8.4)
RBC # BLD AUTO: 5.41 M/UL (ref 4.6–6.2)
SODIUM SERPL-SCNC: 136 MMOL/L (ref 136–145)
WBC # BLD AUTO: 11.07 K/UL (ref 3.9–12.7)

## 2022-06-01 PROCEDURE — 84702 CHORIONIC GONADOTROPIN TEST: CPT | Performed by: INTERNAL MEDICINE

## 2022-06-01 PROCEDURE — 82105 ALPHA-FETOPROTEIN SERUM: CPT | Performed by: INTERNAL MEDICINE

## 2022-06-01 PROCEDURE — 36415 COLL VENOUS BLD VENIPUNCTURE: CPT | Performed by: INTERNAL MEDICINE

## 2022-06-01 PROCEDURE — 85025 COMPLETE CBC W/AUTO DIFF WBC: CPT | Performed by: INTERNAL MEDICINE

## 2022-06-01 PROCEDURE — 80053 COMPREHEN METABOLIC PANEL: CPT | Performed by: INTERNAL MEDICINE

## 2022-06-01 PROCEDURE — 83615 LACTATE (LD) (LDH) ENZYME: CPT | Performed by: INTERNAL MEDICINE

## 2022-06-02 LAB — B-HCG SERPL-ACNC: <0.6 IU/L

## 2022-06-05 NOTE — PROGRESS NOTES
PATIENT: Chao Chapman  MRN: [id number]  DATE: 6/7/2022    Diagnosis:   1. Seminoma of right testis      Chief Complaint: testicular cancer    Oncologic History:      Oncologic History 1. Seminoma of the right testicle - Stage IA (pT1b, pN0, cM0, S0)      Oncologic Treatment 1. Right orchiectomy (3/3/20)  2. Surveillance      Pathology 3/3/20:  Right testicle, orchiectomy:  -Seminoma (4.5 cm in maximum dimension)  -Tumor is limited to the testis  -Negative for lymphovascular invasion  -No lymph nodes submitted or found  -Pathologic stage classification: pT1b  Synoptic report: TESTIS: Radical orchiectomy  Specimen laterality: Right  Tumor focality: Unifocal  Tumor size: 4.5 x 3.8 x 3.5 cm  Histologic type: Seminoma  Tumor extension: Tumor limited to testis  Spermatic cord margin: Uninvolved by tumor  Lymphovascular invasion: Not identified  Regional lymph nodes: No lymph nodes submitted or found  Pathologicp stage classification (TNM):  Primary tumor (pT): pT1b: Tumor 3 cm or larger in size  Regional lymph nodes (pN): pNX: Regional lymph nodes cannot be assessed        Subjective:    History of Present Illness: Mr. Chapman is a 51 y.o. male who presented in March 2020 for evaluation and management of seminoma of right testicle. He was referred by urologist Dr. Tyler.    Telemedicine visit  The patient location is: home  The chief complaint leading to consultation is: testicular cancer    Visit type: audiovisual    Face to Face time with patient: 10 minutes  15 minutes of total time spent on the encounter, which includes face to face time and non-face to face time preparing to see the patient (eg, review of tests), Obtaining and/or reviewing separately obtained history, Documenting clinical information in the electronic or other health record, Independently interpreting results (not separately reported) and communicating results to the patient/family/caregiver, or Care coordination (not separately reported).     Each  patient to whom he or she provides medical services by telemedicine is:  (1) informed of the relationship between the physician and patient and the respective role of any other health care provider with respect to management of the patient; and (2) notified that he or she may decline to receive medical services by telemedicine and may withdraw from such care at any time.    Notes: see below      - presenting symptom was a right testicular mass.  - Ultrasound and CT imaging confirmed the presence of a testicular mass.  - 3/3/20: he underwent a right orchiectomy. Pathology revealed a pure seminoma.  - he states that his Crohn's disease is in remission.  - he underwent labs/CT scan on 9/30/21.    Interval history:  - he presents for a follow-up appointment for his testicular cancer.  - today, he is doing well. He denies shortness of breath, chest pain, nausea, vomiting, diarrhea, constipation. He denies any concerning symptoms to suggest recurrence of cancer.    Past medical, surgical, family, and social histories have been reviewed and updated below.    Past Medical History:   Past Medical History:   Diagnosis Date    Abscess of groin, right     Abscess of right thigh     Anemia     Colonic fistula     Crohn's disease     History of abdominal abscess     Perforated bowel     Severe sepsis        Past Surgical History:   Past Surgical History:   Procedure Laterality Date    COLONOSCOPY      COLONOSCOPY N/A 11/29/2016    Procedure: COLONOSCOPY;  Surgeon: Danika Saunders MD;  Location: [facility];  Service: Endoscopy;  Laterality: N/A;    COLONOSCOPY N/A 8/29/2019    Procedure: COLONOSCOPY;  Surgeon: Melecio Saunders MD;  Location: [facility];  Service: Endoscopy;  Laterality: N/A;    ILEOSTOMY      ILEOSTOMY CLOSURE  07/22/2016    ORCHIECTOMY Right 3/3/2020    Procedure: ORCHIECTOMY;  Surgeon: Harjit Tyler MD;  Location: [address];  Service: Urology;  Laterality: Right;  1.5hr    SKIN  BIOPSY      SMALL INTESTINE SURGERY      UPPER GASTROINTESTINAL ENDOSCOPY         Family History:   Family History   Problem Relation Age of Onset    Diabetes Maternal Grandmother        Social History:  reports that he has been smoking cigarettes. He has been smoking about 0.50 packs per day. He has never used smokeless tobacco. He reports current drug use. Drug: Marijuana. He reports that he does not drink alcohol.    Allergies:  Review of patient's allergies indicates:  No Known Allergies    Medications:  Current Outpatient Medications   Medication Sig Dispense Refill    INFLIXIMAB (REMICADE IV) Inject into the vein every 8 weeks.        No current facility-administered medications for this visit.       Review of Systems   Constitutional: Negative for fatigue.   HENT: Negative for sore throat.    Respiratory: Negative for shortness of breath.    Cardiovascular: Negative for chest pain.   Gastrointestinal: Negative for abdominal pain.   Genitourinary: Negative for dysuria.   Musculoskeletal: Negative for back pain.   Skin: Negative for rash.   Neurological: Negative for headaches.   Hematological: Negative for adenopathy.   Psychiatric/Behavioral: The patient is not nervous/anxious.        ECOG Performance Status:   ECOG SCORE 0       Objective:      Vitals: There were no vitals filed for this visit.  BMI: There is no height or weight on file to calculate BMI.  Deferred since telemedicine visit.    Physical Exam  Deferred since telemedicine visit.    Laboratory Data:  Labs have been reviewed.    Lab Results   Component Value Date    WBC 11.07 06/01/2022    HGB 15.7 06/01/2022    HCT 46.7 06/01/2022    MCV 86 06/01/2022     06/01/2022       Imaging:     - CT abdomen/pelvis (9/30/21): I have personally reviewed the images  Heart: Normal in size. No pericardial effusion.     Lung Bases: Well aerated, without consolidation or pleural fluid.     Liver: Liver is normal in size and overall attenuation.  There  are two subcentimeter hypodense regions within the liver, stable from prior (series 2, image 46, image 28).  Small subcentimeter hypoattenuating focus in the left hepatic lobe (series 2, image 33). .     Gallbladder: Contracted. No calcified gallstones.     Bile Ducts: No evidence of dilated ducts.     Pancreas: No mass or peripancreatic fat stranding.     Spleen: Unremarkable.     Adrenals: Unremarkable.     Kidneys/ Ureters: Normal in size and location.  No hydroureteronephrosis.     Bladder: Allowing for incomplete distention, there is no obvious bladder wall thickening.     Reproductive organs: Postoperative changes of right orchiectomy.  There are prostatic calcifications.     GI Tract/Mesentery: Postoperative changes of appendectomy and small-bowel resection.  No evidence of bowel obstruction or inflammation.     Peritoneal Space: No ascites. No free air.     Retroperitoneum: Nonenlarged para-aortic lymph nodes are seen.  Continued demonstration of increased soft tissue attenuation along the anterior aspect the right iliacus.     Abdominal wall: Continued prominence of the previously mentioned right inguinal lymph node measuring 1.2 cm, previously measuring 1.2 cm (series 2, image 137).  Small fat containing left inguinal hernia.     Vasculature: No significant atherosclerosis or aneurysm.     Bones: No acute fracture.  Redemonstration of sclerotic foci in the L1 vertebral body, left 7th rib, and right femoral head which are all stable from prior.     Impression:     In this patient with a history of testicular cancer status post right orchectomy there are 2 stable hypodense regions within the right lobe of the liver.  There is a subcentimeter hypodensity within the left hepatic lobe not definitely seen prior imaging.     Continued prominence of previously mentioned right inguinal lymph node, stable from prior.     Continued demonstration of the increased heterogenous soft tissue anterior to the right iliacus,  which appears stable from prior.    - CT abdomen/pelvis (9/15/20): I have personally reviewed the images  In this patient with history of testicular cancer, following findings are identified:     1. Postsurgical changes from right-sided okay ectomy.     2. No evidence of local disease recurrence or metastatic disease.     Postsurgical changes at the terminal ileum with increased soft tissue density adjacent to this area which may represent sequela of prior infection in a patient with previous known abscess in this region.     2 tiny hypoattenuating lesions within the right hepatic lobe, too small to definitively characterize, however appear similar to prior examinations, and are statistically favored to be benign such as simple hepatic cyst versus hemangioma.     Hyperenhancing focus within anorectal junction, which may represent vascular malformation versus hemorrhoid.    CT abdomen/pelvis (6/17/20): I have personally reviewed the images  - In this patient with history of testicular cancer with interval postoperative change from right-sided orchiectomy, there is no evidence of metastatic disease.  - Stable postoperative changes near the terminal ileum with a stable soft tissue density adjacent to the cecum.  Questionable 2 cm collection anterior to the right iliopsoas muscle may represent sequelae of prior infection though a small recurrent abscess is not excluded.  Recommend clinical correlation.  - Short segments of wall thickening in the distal descending and sigmoid colon.  This may represent normal peristalsis.  Review of the electronic medical record indicates that patient underwent recent colonoscopy on 08/29/2019 that identified no significant abnormality throughout the entire exam portion of the colon.    CT chest/abdomen/pelvis (3/2/20): I have personally reviewed the images  - In this patient with history of testicular neoplasm, right testicular mass is visualized.  No evidence of metastatic disease,  noting small inguinal and periaortic lymph nodes without definite pathologic adenopathy.  - Postoperative change of the terminal ileum, as well as small area of more linear soft tissue density adjacent to the cecum.  This may represent sequela of prior infection as patient with known abscess in this region, with other considerations including postoperative change.    Assessment:       1. Seminoma of right testis         Plan:     1. Seminoma of right testicle - Stage IA (pT1b, pN0, cM0, S0)  - I have reviewed his chart  - 3/3/20: he underwent a right orchiectomy. Pathology revealed a pure seminoma.  - given his stage and this being a seminoma, I recommend surveillance, which is strongly preferred in the [employer] guidelines (see below).      - CT abdomen/pelvis (6/17/20) was negative for recurrent/metastatic disease. Tumor markers were within normal limits.   - CT abdomen/pelvis (9/15/20) was negative for recurrent/metastatic disease. Tumor markers were within normal limits.   - CT abdomen/pelvis (3/31/21) was negative for recurrent/metastatic disease. Tumor markers were within normal limits.   - CT abdomen/pelvis (9/30/21) was negative for recurrent/metastatic disease. Tumor markers were within normal limits.   - He denies any concerning symptoms to suggest recurrence of cancer.  - Labs have been reviewed. Tumor markers were normal.  - return to clinic in 6 months with repeat labs/scan.    2. Advance Care Planning     Power of   After our discussion at previous visit, the patient decided to complete a HCPOA and appointed his Soledad Chapman ([phone]).        - return to clinic in 6 months with repeat labs/scan.    London Altamirano M.D.  Hematology/Oncology  [facility] [address], [address]  [place] [address]  Phone: [phone]  Fax: [phone]

## 2022-06-07 ENCOUNTER — OFFICE VISIT (OUTPATIENT)
Dept: HEMATOLOGY/ONCOLOGY | Facility: CLINIC | Age: 52
End: 2022-06-07
Payer: COMMERCIAL

## 2022-06-07 DIAGNOSIS — C62.91 SEMINOMA OF RIGHT TESTIS: Primary | ICD-10-CM

## 2022-06-07 PROCEDURE — 99214 PR OFFICE/OUTPT VISIT, EST, LEVL IV, 30-39 MIN: ICD-10-PCS | Mod: 95,,, | Performed by: INTERNAL MEDICINE

## 2022-06-07 PROCEDURE — 99214 OFFICE O/P EST MOD 30 MIN: CPT | Mod: 95,,, | Performed by: INTERNAL MEDICINE

## 2022-06-07 PROCEDURE — 1160F RVW MEDS BY RX/DR IN RCRD: CPT | Mod: CPTII,95,, | Performed by: INTERNAL MEDICINE

## 2022-06-07 PROCEDURE — 1160F PR REVIEW ALL MEDS BY PRESCRIBER/CLIN PHARMACIST DOCUMENTED: ICD-10-PCS | Mod: CPTII,95,, | Performed by: INTERNAL MEDICINE

## 2022-06-07 PROCEDURE — 1159F MED LIST DOCD IN RCRD: CPT | Mod: CPTII,95,, | Performed by: INTERNAL MEDICINE

## 2022-06-07 PROCEDURE — 1159F PR MEDICATION LIST DOCUMENTED IN MEDICAL RECORD: ICD-10-PCS | Mod: CPTII,95,, | Performed by: INTERNAL MEDICINE

## 2022-06-07 NOTE — Clinical Note
1. Schedule labs cbc, cmp, AFP, beta hcg, LDH, and CT abdomen/pelvis in 6 months (December 2022). 2. Schedule f/u appt with me day after labs/scan. It can be a virtual appt.  Thanks!

## 2022-12-07 NOTE — PROGRESS NOTES
PATIENT: Chao Chapman  MRN: [id number]  DATE: 12/8/2022    Diagnosis:   1. History of testicular cancer    2. Crohn's disease of small intestine with other complication    3. Immunodeficiency due to drug therapy      Chief Complaint: history of testicular cancer    Oncologic History:      Oncologic History 1. 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 Right orchiectomy (3/3/20)  2. Surveillance      Pathology 3/3/20:  Right testicle, orchiectomy:  -Seminoma (4.5 cm in maximum dimension)  -Tumor is limited to the testis  -Negative for lymphovascular invasion  -No lymph nodes submitted or found  -Pathologic stage classification: pT1b  Synoptic report: TESTIS: Radical orchiectomy  Specimen laterality: Right  Tumor focality: Unifocal  Tumor size: 4.5 x 3.8 x 3.5 cm  Histologic type: Seminoma  Tumor extension: Tumor limited to testis  Spermatic cord margin: Uninvolved by tumor  Lymphovascular invasion: Not identified  Regional lymph nodes: No lymph nodes submitted or found  Pathologicp stage classification (TNM):  Primary tumor (pT): pT1b: Tumor 3 cm or larger in size  Regional lymph nodes (pN): pNX: Regional lymph nodes cannot be assessed        Subjective:    History of Present Illness: Mr. Chapman is a 52 y.o. male who presented in March 2020 for evaluation and management of seminoma of right testicle. He was referred by urologist Dr. Tyler.    Telemedicine visit  The patient location is: home  The chief complaint leading to consultation is: testicular cancer    Visit type: audiovisual    Face to Face time with patient: 10 minutes  15 minutes of total time spent on the encounter, which includes face to face time and non-face to face time preparing to see the patient (eg, review of tests), Obtaining and/or reviewing separately obtained history, Documenting clinical information in the electronic or other health record, Independently interpreting results (not separately  reported) and communicating results to the patient/family/caregiver, or Care coordination (not separately reported).     Each patient to whom he or she provides medical services by telemedicine is:  (1) informed of the relationship between the physician and patient and the respective role of any other health care provider with respect to management of the patient; and (2) notified that he or she may decline to receive medical services by telemedicine and may withdraw from such care at any time.    Notes: see below      - presenting symptom was a right testicular mass.  - Ultrasound and CT imaging confirmed the presence of a testicular mass.  - 3/3/20: he underwent a right orchiectomy. Pathology revealed a pure seminoma.  - he states that his Crohn's disease is in remission.  - he underwent labs/CT scan on 9/30/21.    Interval history:  - he presents for a follow-up appointment for his testicular cancer.  - he underwent CT scan on 12/7/22.  - today, he is doing well. He denies shortness of breath, chest pain, nausea, vomiting, diarrhea, constipation. He denies any concerning symptoms to suggest recurrence of cancer.  - he takes remicade infusions for crohn's disease.    Past medical, surgical, family, and social histories have been reviewed and updated below.    Past Medical History:   Past Medical History:   Diagnosis Date    Abscess of groin, right     Abscess of right thigh     Anemia     Colonic fistula     Crohn's disease     History of abdominal abscess     Perforated bowel     Severe sepsis        Past Surgical History:   Past Surgical History:   Procedure Laterality Date    COLONOSCOPY      COLONOSCOPY N/A 11/29/2016    Procedure: COLONOSCOPY;  Surgeon: Danika Saunders MD;  Location: [facility];  Service: Endoscopy;  Laterality: N/A;    COLONOSCOPY N/A 8/29/2019    Procedure: COLONOSCOPY;  Surgeon: Melecio Saunders MD;  Location: [facility];  Service: Endoscopy;  Laterality: N/A;    COLONOSCOPY N/A  7/18/2022    Procedure: COLONOSCOPY;  Surgeon: Rosales Mac MD;  Location: [facility];  Service: Endoscopy;  Laterality: N/A;    ILEOSTOMY      ILEOSTOMY CLOSURE  07/22/2016    ORCHIECTOMY Right 3/3/2020    Procedure: ORCHIECTOMY;  Surgeon: Harjit Tyler MD;  Location: [address];  Service: Urology;  Laterality: Right;  1.5hr    SKIN BIOPSY      SMALL INTESTINE SURGERY      UPPER GASTROINTESTINAL ENDOSCOPY         Family History:   Family History   Problem Relation Age of Onset    Diabetes Maternal Grandmother        Social History:  reports that he has been smoking cigarettes. He has been smoking an average of .5 packs per day. He has never used smokeless tobacco. He reports current drug use. Drug: Marijuana. He reports that he does not drink alcohol.    Allergies:  Review of patient's allergies indicates:  No Known Allergies    Medications:  Current Outpatient Medications   Medication Sig Dispense Refill    INFLIXIMAB (REMICADE IV) Inject into the vein every 8 weeks.        No current facility-administered medications for this visit.       Review of Systems   Constitutional:  Negative for fatigue.   HENT:  Negative for sore throat.    Respiratory:  Negative for shortness of breath.    Cardiovascular:  Negative for chest pain.   Gastrointestinal:  Negative for abdominal pain.   Genitourinary:  Negative for dysuria.   Musculoskeletal:  Negative for back pain.   Skin:  Negative for rash.   Neurological:  Negative for headaches.   Hematological:  Negative for adenopathy.   Psychiatric/Behavioral:  The patient is not nervous/anxious.      ECOG Performance Status:   ECOG SCORE 0       Objective:      Vitals: There were no vitals filed for this visit.  BMI: There is no height or weight on file to calculate BMI.  Deferred since telemedicine visit.    Physical Exam  Deferred since telemedicine visit.    Laboratory Data:  Labs have been reviewed.    Lab Results   Component Value Date    WBC 11.07 06/01/2022     HGB 15.7 06/01/2022    HCT 46.7 06/01/2022    MCV 86 06/01/2022     06/01/2022       Imaging:     - CT abdomen/pelvis (12/7/22): I have personally reviewed the images  CT abdomen:     Scans of the lung bases unremarkable.     Examination of the upper abdomen shows the stomach distended with undigested food, fluid and oral contrast     The liver remains normal in size and shape, there is stable multiple subcentimeter low-density masses in right hepatic lobe most consistent with benign etiology such as micro hamartomas versus volume averaging of small cyst     The spleen, pancreas, adrenals and both kidneys are normal in size and shape with no worrisome renal masses.     No upper abdominal lymphadenopathy is present.     CT scan pelvis     Delayed scans through the entirety of abdomen and pelvis show both ureters to be symmetric and normal in size.  The bladder is partially filled and normal in size.     There is evidence of prior right colon surgery, the large and small bowel remain normal in caliber.     No lower abdominal lymphadenopathy is present.  Stable bilateral minimally enlarged inguinal lymph nodes again noted.     The patient is post right orchiectomy.     Bone windows show a stable small sclerotic foci in right lateral aspect L1 vertebra consistent with bone island.     Impression:     1.  Stable appearance to abdomen and pelvis with no CT findings suggestive of metastatic disease in patient post right orchiectomy.     2.  Gastric distention of unknown clinical significance      - CT abdomen/pelvis (9/30/21): I have personally reviewed the images  Heart: Normal in size. No pericardial effusion.     Lung Bases: Well aerated, without consolidation or pleural fluid.     Liver: Liver is normal in size and overall attenuation.  There are two subcentimeter hypodense regions within the liver, stable from prior (series 2, image 46, image 28).  Small subcentimeter hypoattenuating focus in the left hepatic  lobe (series 2, image 33). .     Gallbladder: Contracted. No calcified gallstones.     Bile Ducts: No evidence of dilated ducts.     Pancreas: No mass or peripancreatic fat stranding.     Spleen: Unremarkable.     Adrenals: Unremarkable.     Kidneys/ Ureters: Normal in size and location.  No hydroureteronephrosis.     Bladder: Allowing for incomplete distention, there is no obvious bladder wall thickening.     Reproductive organs: Postoperative changes of right orchiectomy.  There are prostatic calcifications.     GI Tract/Mesentery: Postoperative changes of appendectomy and small-bowel resection.  No evidence of bowel obstruction or inflammation.     Peritoneal Space: No ascites. No free air.     Retroperitoneum: Nonenlarged para-aortic lymph nodes are seen.  Continued demonstration of increased soft tissue attenuation along the anterior aspect the right iliacus.     Abdominal wall: Continued prominence of the previously mentioned right inguinal lymph node measuring 1.2 cm, previously measuring 1.2 cm (series 2, image 137).  Small fat containing left inguinal hernia.     Vasculature: No significant atherosclerosis or aneurysm.     Bones: No acute fracture.  Redemonstration of sclerotic foci in the L1 vertebral body, left 7th rib, and right femoral head which are all stable from prior.     Impression:     In this patient with a history of testicular cancer status post right orchectomy there are 2 stable hypodense regions within the right lobe of the liver.  There is a subcentimeter hypodensity within the left hepatic lobe not definitely seen prior imaging.     Continued prominence of previously mentioned right inguinal lymph node, stable from prior.     Continued demonstration of the increased heterogenous soft tissue anterior to the right iliacus, which appears stable from prior.    - CT abdomen/pelvis (9/15/20): I have personally reviewed the images  In this patient with history of testicular cancer, following  findings are identified:     1. Postsurgical changes from right-sided okay ectomy.     2. No evidence of local disease recurrence or metastatic disease.     Postsurgical changes at the terminal ileum with increased soft tissue density adjacent to this area which may represent sequela of prior infection in a patient with previous known abscess in this region.     2 tiny hypoattenuating lesions within the right hepatic lobe, too small to definitively characterize, however appear similar to prior examinations, and are statistically favored to be benign such as simple hepatic cyst versus hemangioma.     Hyperenhancing focus within anorectal junction, which may represent vascular malformation versus hemorrhoid.    CT abdomen/pelvis (6/17/20): I have personally reviewed the images  - In this patient with history of testicular cancer with interval postoperative change from right-sided orchiectomy, there is no evidence of metastatic disease.  - Stable postoperative changes near the terminal ileum with a stable soft tissue density adjacent to the cecum.  Questionable 2 cm collection anterior to the right iliopsoas muscle may represent sequelae of prior infection though a small recurrent abscess is not excluded.  Recommend clinical correlation.  - Short segments of wall thickening in the distal descending and sigmoid colon.  This may represent normal peristalsis.  Review of the electronic medical record indicates that patient underwent recent colonoscopy on 08/29/2019 that identified no significant abnormality throughout the entire exam portion of the colon.    CT chest/abdomen/pelvis (3/2/20): I have personally reviewed the images  - In this patient with history of testicular neoplasm, right testicular mass is visualized.  No evidence of metastatic disease, noting small inguinal and periaortic lymph nodes without definite pathologic adenopathy.  - Postoperative change of the terminal ileum, as well as small area of more  linear soft tissue density adjacent to the cecum.  This may represent sequela of prior infection as patient with known abscess in this region, with other considerations including postoperative change.    Assessment:       1. History of testicular cancer    2. Crohn's disease of small intestine with other complication    3. Immunodeficiency due to drug therapy         Plan:     1. Seminoma of right testicle - Stage IA (pT1b, pN0, cM0, S0)  - I have reviewed his chart  - 3/3/20: he underwent a right orchiectomy. Pathology revealed a pure seminoma.  - given his stage and this being a seminoma, I recommend surveillance, which is strongly preferred in the [employer] guidelines (see below).      - CT abdomen/pelvis (6/17/20) was negative for recurrent/metastatic disease. Tumor markers were within normal limits.   - CT abdomen/pelvis (9/15/20) was negative for recurrent/metastatic disease. Tumor markers were within normal limits.   - CT abdomen/pelvis (3/31/21) was negative for recurrent/metastatic disease. Tumor markers were within normal limits.   - CT abdomen/pelvis (9/30/21) was negative for recurrent/metastatic disease. Tumor markers were within normal limits.   - He denies any concerning symptoms to suggest recurrence of cancer.  - CT abdomen/pelvis (12/7/22) was negative for recurrent/metastatic disease. Tumor markers were within normal limits.   - return to clinic in 1 year with repeat scans/labs.    2. Crohn's disease / immunodeficiency due to drugs  - stable on remicade  - defer management to gastroenterology    3. Advance Care Planning     Power of   After our discussion at previous visit, the patient decided to complete a HCPOA and appointed his  Soledad Chapman ([phone]) .        - return to clinic in 1 year with repeat scans/labs.    London Altamirano M.D.  Hematology/Oncology  [facility] - [address], [address]  [place] [address]  Phone:  [phone]  Fax: [phone]

## 2022-12-08 ENCOUNTER — OFFICE VISIT (OUTPATIENT)
Dept: HEMATOLOGY/ONCOLOGY | Facility: CLINIC | Age: 52
End: 2022-12-08
Payer: COMMERCIAL

## 2022-12-08 DIAGNOSIS — Z79.899 IMMUNODEFICIENCY DUE TO DRUG THERAPY: ICD-10-CM

## 2022-12-08 DIAGNOSIS — D84.821 IMMUNODEFICIENCY DUE TO DRUG THERAPY: ICD-10-CM

## 2022-12-08 DIAGNOSIS — Z85.47 HISTORY OF TESTICULAR CANCER: Primary | ICD-10-CM

## 2022-12-08 DIAGNOSIS — K50.018 CROHN'S DISEASE OF SMALL INTESTINE WITH OTHER COMPLICATION: ICD-10-CM

## 2022-12-08 PROCEDURE — 1160F PR REVIEW ALL MEDS BY PRESCRIBER/CLIN PHARMACIST DOCUMENTED: ICD-10-PCS | Mod: CPTII,95,, | Performed by: INTERNAL MEDICINE

## 2022-12-08 PROCEDURE — 99214 OFFICE O/P EST MOD 30 MIN: CPT | Mod: 95,,, | Performed by: INTERNAL MEDICINE

## 2022-12-08 PROCEDURE — 1159F MED LIST DOCD IN RCRD: CPT | Mod: CPTII,95,, | Performed by: INTERNAL MEDICINE

## 2022-12-08 PROCEDURE — 1160F RVW MEDS BY RX/DR IN RCRD: CPT | Mod: CPTII,95,, | Performed by: INTERNAL MEDICINE

## 2022-12-08 PROCEDURE — 99214 PR OFFICE/OUTPT VISIT, EST, LEVL IV, 30-39 MIN: ICD-10-PCS | Mod: 95,,, | Performed by: INTERNAL MEDICINE

## 2022-12-08 PROCEDURE — 1159F PR MEDICATION LIST DOCUMENTED IN MEDICAL RECORD: ICD-10-PCS | Mod: CPTII,95,, | Performed by: INTERNAL MEDICINE

## 2022-12-08 NOTE — Clinical Note
1. Schedule labs cbc, cmp, ldh, afp, bhcg, and ct abdomen/pelvis at [facility] in one year 2. Schedule virtual appt 1-2 days after labs/scan.  Thanks!

## 2022-12-20 ENCOUNTER — OFFICE VISIT (OUTPATIENT)
Dept: URGENT CARE | Facility: CLINIC | Age: 52
End: 2022-12-20
Payer: COMMERCIAL

## 2022-12-20 VITALS
DIASTOLIC BLOOD PRESSURE: 86 MMHG | RESPIRATION RATE: 18 BRPM | BODY MASS INDEX: 25.9 KG/M2 | OXYGEN SATURATION: 96 % | SYSTOLIC BLOOD PRESSURE: 134 MMHG | HEIGHT: 67 IN | HEART RATE: 84 BPM | WEIGHT: 165 LBS | TEMPERATURE: 98 F

## 2022-12-20 DIAGNOSIS — J40 BRONCHITIS: Primary | ICD-10-CM

## 2022-12-20 DIAGNOSIS — R05.9 COUGH, UNSPECIFIED TYPE: ICD-10-CM

## 2022-12-20 LAB
CTP QC/QA: YES
CTP QC/QA: YES
POC MOLECULAR INFLUENZA A AGN: NEGATIVE
POC MOLECULAR INFLUENZA B AGN: NEGATIVE
SARS-COV-2 AG RESP QL IA.RAPID: NEGATIVE

## 2022-12-20 PROCEDURE — 3075F SYST BP GE 130 - 139MM HG: CPT | Mod: CPTII,S$GLB,,

## 2022-12-20 PROCEDURE — 1160F PR REVIEW ALL MEDS BY PRESCRIBER/CLIN PHARMACIST DOCUMENTED: ICD-10-PCS | Mod: CPTII,S$GLB,,

## 2022-12-20 PROCEDURE — 99214 OFFICE O/P EST MOD 30 MIN: CPT | Mod: S$GLB,,,

## 2022-12-20 PROCEDURE — 3075F PR MOST RECENT SYSTOLIC BLOOD PRESS GE 130-139MM HG: ICD-10-PCS | Mod: CPTII,S$GLB,,

## 2022-12-20 PROCEDURE — 71046 X-RAY EXAM CHEST 2 VIEWS: CPT | Mod: FY,S$GLB,, | Performed by: RADIOLOGY

## 2022-12-20 PROCEDURE — 1160F RVW MEDS BY RX/DR IN RCRD: CPT | Mod: CPTII,S$GLB,,

## 2022-12-20 PROCEDURE — 87811 SARS CORONAVIRUS 2 ANTIGEN POCT, MANUAL READ: ICD-10-PCS | Mod: QW,S$GLB,,

## 2022-12-20 PROCEDURE — 87502 INFLUENZA DNA AMP PROBE: CPT | Mod: QW,S$GLB,,

## 2022-12-20 PROCEDURE — 87811 SARS-COV-2 COVID19 W/OPTIC: CPT | Mod: QW,S$GLB,,

## 2022-12-20 PROCEDURE — 99214 PR OFFICE/OUTPT VISIT, EST, LEVL IV, 30-39 MIN: ICD-10-PCS | Mod: S$GLB,,,

## 2022-12-20 PROCEDURE — 3008F PR BODY MASS INDEX (BMI) DOCUMENTED: ICD-10-PCS | Mod: CPTII,S$GLB,,

## 2022-12-20 PROCEDURE — 87502 POCT INFLUENZA A/B MOLECULAR: ICD-10-PCS | Mod: QW,S$GLB,,

## 2022-12-20 PROCEDURE — 3079F PR MOST RECENT DIASTOLIC BLOOD PRESSURE 80-89 MM HG: ICD-10-PCS | Mod: CPTII,S$GLB,,

## 2022-12-20 PROCEDURE — 3079F DIAST BP 80-89 MM HG: CPT | Mod: CPTII,S$GLB,,

## 2022-12-20 PROCEDURE — 1159F MED LIST DOCD IN RCRD: CPT | Mod: CPTII,S$GLB,,

## 2022-12-20 PROCEDURE — 1159F PR MEDICATION LIST DOCUMENTED IN MEDICAL RECORD: ICD-10-PCS | Mod: CPTII,S$GLB,,

## 2022-12-20 PROCEDURE — 3008F BODY MASS INDEX DOCD: CPT | Mod: CPTII,S$GLB,,

## 2022-12-20 PROCEDURE — 71046 XR CHEST PA AND LATERAL: ICD-10-PCS | Mod: FY,S$GLB,, | Performed by: RADIOLOGY

## 2022-12-20 RX ORDER — PROMETHAZINE HYDROCHLORIDE AND DEXTROMETHORPHAN HYDROBROMIDE 6.25; 15 MG/5ML; MG/5ML
5 SYRUP ORAL NIGHTLY PRN
Qty: 118 ML | Refills: 0 | Status: SHIPPED | OUTPATIENT
Start: 2022-12-20 | End: 2022-12-30

## 2022-12-20 RX ORDER — ALBUTEROL SULFATE 90 UG/1
2 AEROSOL, METERED RESPIRATORY (INHALATION) EVERY 6 HOURS PRN
Qty: 18 G | Refills: 0 | Status: SHIPPED | OUTPATIENT
Start: 2022-12-20 | End: 2023-09-07 | Stop reason: SDUPTHER

## 2022-12-20 RX ORDER — BENZONATATE 100 MG/1
100 CAPSULE ORAL 3 TIMES DAILY PRN
Qty: 30 CAPSULE | Refills: 0 | Status: SHIPPED | OUTPATIENT
Start: 2022-12-20 | End: 2022-12-30

## 2022-12-20 RX ORDER — METHYLPREDNISOLONE 4 MG/1
TABLET ORAL
Qty: 21 EACH | Refills: 0 | Status: SHIPPED | OUTPATIENT
Start: 2022-12-20 | End: 2023-01-10

## 2022-12-20 NOTE — LETTER
December 20, 2022      [facility]  [address] [address]  Phone: [phone]  Fax: [phone]       Patient: Chao Chapman   YOB: 1970  Date of Visit: 12/20/2022    To Whom It May Concern:    Brie Chapman  was at [facility] on 12/20/2022. The patient may return to work/school on 12/26/22 with no restrictions. If you have any questions or concerns, or if I can be of further assistance, please do not hesitate to contact me.    Sincerely,    Silvia Sim, NP

## 2022-12-20 NOTE — PATIENT INSTRUCTIONS
PLEASE READ YOUR DISCHARGE INSTRUCTIONS ENTIRELY AS IT CONTAINS IMPORTANT INFORMATION.    Please take your steroids to completion.     Use the albuterol inhaler for wheezing.     Tessalon perles for coughing during the day. Promethazine DM for night time.   Do not drive while taking the cough syrup - best to take it at night before going to sleep. However, you can take it during the day (every 4-6 hours) if you do not have to drive or operate machinery. This medication will make you drowsy. Try taking half a dose first to see how it affects you.    Please return or see your primary care doctor if you develop new or worsening symptoms.     Please arrange follow up with your primary medical clinic as soon as possible. You must understand that you've received an Urgent Care treatment only and that you may be released before all of your medical problems are known or treated. You, the patient, will arrange for follow up as instructed. If your symptoms worsen or fail to improve you should go to the Emergency Room.

## 2022-12-20 NOTE — PROGRESS NOTES
"Subjective:       Patient ID: Chao Chapman is a 52 y.o. male.    Vitals:  height is 5' 7" (1.702 m) and weight is 74.8 kg (165 lb). His temperature is 97.8 °F (36.6 °C). His blood pressure is 134/86 and his pulse is 84. His respiration is 18 and oxygen saturation is 96%.     Chief Complaint: Cough    51 yo male patient reports sinus congestion and cough that started a week ago. Patient reports that the sinus congestion has cleared up, but his cough is getting worse. Patient also reports wheezing and shortness of breath when coughing. Patient denies fever, body aches or chills, sore throat, nausea/vomiting, diarrhea, abdominal pain, chest pain or dizziness.    Cough  This is a new problem. The current episode started in the past 7 days. The cough is Productive of sputum. Associated symptoms include nasal congestion, rhinorrhea and shortness of breath. The symptoms are aggravated by lying down. He has tried OTC cough suppressant and rest for the symptoms. The treatment provided mild relief.     Respiratory:  Positive for cough and shortness of breath.      Objective:      Physical Exam   Constitutional: He is oriented to person, place, and time. He appears well-developed. He is cooperative.  Non-toxic appearance. He does not appear ill. No distress.   HENT:   Head: Normocephalic and atraumatic.   Ears:   Right Ear: Hearing, tympanic membrane, external ear and ear canal normal.   Left Ear: Hearing, tympanic membrane, external ear and ear canal normal.   Nose: Nose normal. No mucosal edema, rhinorrhea or nasal deformity. No epistaxis. Right sinus exhibits no maxillary sinus tenderness and no frontal sinus tenderness. Left sinus exhibits no maxillary sinus tenderness and no frontal sinus tenderness.   Mouth/Throat: Uvula is midline and mucous membranes are normal. No trismus in the jaw. Normal dentition. No uvula swelling. Posterior oropharyngeal erythema present. No oropharyngeal exudate or posterior oropharyngeal " edema.   Eyes: Conjunctivae and lids are normal. No scleral icterus.   Neck: Trachea normal and phonation normal. Neck supple. No edema present. No erythema present. No neck rigidity present.   Cardiovascular: Normal rate, regular rhythm, normal heart sounds and normal pulses.   Pulmonary/Chest: Effort normal. No respiratory distress. He has no decreased breath sounds. He has wheezes (mild wheezing throughout lung fields). He has no rhonchi.   Abdominal: Normal appearance.   Musculoskeletal: Normal range of motion.         General: No deformity. Normal range of motion.   Neurological: He is alert and oriented to person, place, and time. He exhibits normal muscle tone. Coordination normal.   Skin: Skin is warm, dry, intact, not diaphoretic and not pale.   Psychiatric: His speech is normal and behavior is normal. Judgment and thought content normal.   Nursing note and vitals reviewed.      Results for orders placed or performed in visit on 12/20/22   SARS Coronavirus 2 Antigen, POCT Manual Read   Result Value Ref Range    SARS Coronavirus 2 Antigen Negative Negative     Acceptable Yes    POCT Influenza A/B MOLECULAR   Result Value Ref Range    POC Molecular Influenza A Ag Negative Negative, Not Reported    POC Molecular Influenza B Ag Negative Negative, Not Reported     Acceptable Yes    XR CHEST PA AND LATERAL    Result Date: 12/20/2022  EXAMINATION: XR CHEST PA AND LATERAL CLINICAL HISTORY: Cough, unspecified TECHNIQUE: PA and lateral views of the chest were performed. COMPARISON: 07/07/2016 FINDINGS: The lungs are clear.  No effusion.  Heart size is within normal limits.     No acute cardiopulmonary disease Electronically signed by: Marlen Martin Date:    12/20/2022 Time:    08:50      Assessment:       1. Bronchitis    2. Cough, unspecified type          Plan:         Bronchitis  -     benzonatate (TESSALON) 100 MG capsule; Take 1 capsule (100 mg total) by mouth 3 (three) times  daily as needed for Cough.  Dispense: 30 capsule; Refill: 0  -     promethazine-dextromethorphan (PROMETHAZINE-DM) 6.25-15 mg/5 mL Syrp; Take 5 mLs by mouth nightly as needed (cough).  Dispense: 118 mL; Refill: 0  -     albuterol (VENTOLIN HFA) 90 mcg/actuation inhaler; Inhale 2 puffs into the lungs every 6 (six) hours as needed for Wheezing. Rescue  Dispense: 18 g; Refill: 0  -     methylPREDNISolone (MEDROL DOSEPACK) 4 mg tablet; use as directed  Dispense: 21 each; Refill: 0    Cough, unspecified type  -     SARS Coronavirus 2 Antigen, POCT Manual Read  -     POCT Influenza A/B MOLECULAR  -     XR CHEST PA AND LATERAL; Future; Expected date: 12/20/2022      Patient Instructions   PLEASE READ YOUR DISCHARGE INSTRUCTIONS ENTIRELY AS IT CONTAINS IMPORTANT INFORMATION.    Please take your steroids to completion.     Use the albuterol inhaler for wheezing.     Tessalon perles for coughing during the day. Promethazine DM for night time.   Do not drive while taking the cough syrup - best to take it at night before going to sleep. However, you can take it during the day (every 4-6 hours) if you do not have to drive or operate machinery. This medication will make you drowsy. Try taking half a dose first to see how it affects you.    Please return or see your primary care doctor if you develop new or worsening symptoms.     Please arrange follow up with your primary medical clinic as soon as possible. You must understand that you've received an Urgent Care treatment only and that you may be released before all of your medical problems are known or treated. You, the patient, will arrange for follow up as instructed. If your symptoms worsen or fail to improve you should go to the Emergency Room.

## 2023-12-08 NOTE — PROGRESS NOTES
PATIENT: Chao Chapman  MRN: [id number]  DATE: 12/11/2023    Diagnosis:   1. History of testicular cancer    2. Crohn's disease of small intestine with other complication    3. Immunodeficiency due to drug therapy      Chief Complaint: history of testicular cancer    Oncologic History:      Oncologic History 1. 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 Right orchiectomy (3/3/20)  2. Surveillance      Pathology 3/3/20:  Right testicle, orchiectomy:  -Seminoma (4.5 cm in maximum dimension)  -Tumor is limited to the testis  -Negative for lymphovascular invasion  -No lymph nodes submitted or found  -Pathologic stage classification: pT1b  Synoptic report: TESTIS: Radical orchiectomy  Specimen laterality: Right  Tumor focality: Unifocal  Tumor size: 4.5 x 3.8 x 3.5 cm  Histologic type: Seminoma  Tumor extension: Tumor limited to testis  Spermatic cord margin: Uninvolved by tumor  Lymphovascular invasion: Not identified  Regional lymph nodes: No lymph nodes submitted or found  Pathologicp stage classification (TNM):  Primary tumor (pT): pT1b: Tumor 3 cm or larger in size  Regional lymph nodes (pN): pNX: Regional lymph nodes cannot be assessed        Subjective:    History of Present Illness: Mr. Chapman is a 53 y.o. male who presented in March 2020 for evaluation and management of seminoma of right testicle. He was referred by urologist Dr. Tyler.    Telemedicine visit  The patient location is: work  The chief complaint leading to consultation is: testicular cancer    Visit type: audiovisual    Face to Face time with patient: 7 minutes  10 minutes of total time spent on the encounter, which includes face to face time and non-face to face time preparing to see the patient (eg, review of tests), Obtaining and/or reviewing separately obtained history, Documenting clinical information in the electronic or other health record, Independently interpreting results (not separately  "reported) and communicating results to the patient/family/caregiver, or Care coordination (not separately reported).     Each patient to whom he or she provides medical services by telemedicine is:  (1) informed of the relationship between the physician and patient and the respective role of any other health care provider with respect to management of the patient; and (2) notified that he or she may decline to receive medical services by telemedicine and may withdraw from such care at any time.    Notes: see below      - presenting symptom was a right testicular mass.  - Ultrasound and CT imaging confirmed the presence of a testicular mass.  - 3/3/20: he underwent a right orchiectomy. Pathology revealed a pure seminoma.  - he states that his Crohn's disease is in remission.  - he underwent labs/CT scan on 9/30/21.  - he underwent CT scan on 12/7/22.  - he takes remicade infusions for crohn's disease.      Interval history:  - he presents for a follow-up appointment for his testicular cancer.  - he underwent CT scan on 12/8/23  - today, he is "great!." No clinical changes in the past year. He denies shortness of breath, chest pain, nausea, vomiting, diarrhea, constipation. He denies any concerning symptoms to suggest recurrence of cancer.     Past medical, surgical, family, and social histories have been reviewed and updated below.    Past Medical History:   Past Medical History:   Diagnosis Date    Abscess of groin, right     Abscess of right thigh     Anemia     Colonic fistula     Crohn's disease     History of abdominal abscess     Perforated bowel     Severe sepsis        Past Surgical History:   Past Surgical History:   Procedure Laterality Date    COLONOSCOPY      COLONOSCOPY N/A 11/29/2016    Procedure: COLONOSCOPY;  Surgeon: Danika Saunders MD;  Location: [facility];  Service: Endoscopy;  Laterality: N/A;    COLONOSCOPY N/A 8/29/2019    Procedure: COLONOSCOPY;  Surgeon: Melecio Suanders MD;  " Location: [facility];  Service: Endoscopy;  Laterality: N/A;    COLONOSCOPY N/A 7/18/2022    Procedure: COLONOSCOPY;  Surgeon: Rosales Mac MD;  Location: [facility];  Service: Endoscopy;  Laterality: N/A;    ILEOSTOMY      ILEOSTOMY CLOSURE  07/22/2016    ORCHIECTOMY Right 3/3/2020    Procedure: ORCHIECTOMY;  Surgeon: Harjit Tyler MD;  Location: [address];  Service: Urology;  Laterality: Right;  1.5hr    SKIN BIOPSY      SMALL INTESTINE SURGERY      UPPER GASTROINTESTINAL ENDOSCOPY         Family History:   Family History   Problem Relation Age of Onset    Diabetes Maternal Grandmother        Social History:  reports that he has been smoking cigarettes. He has never used smokeless tobacco. He reports current drug use. Drug: Marijuana. He reports that he does not drink alcohol.    Allergies:  Review of patient's allergies indicates:  No Known Allergies    Medications:  Current Outpatient Medications   Medication Sig Dispense Refill    albuterol (VENTOLIN HFA) 90 mcg/actuation inhaler Inhale 2 puffs into the lungs every 6 (six) hours as needed for Wheezing. Rescue 18 g 0    INFLIXIMAB (REMICADE IV) Inject into the vein every 8 weeks.        No current facility-administered medications for this visit.     Facility-Administered Medications Ordered in Other Visits   Medication Dose Route Frequency Provider Last Rate Last Admin    iohexoL (OMNIPAQUE 350) injection 80 mL  80 mL Intravenous ONCE PRN London Altamirano MD           Review of Systems   Constitutional:  Negative for fatigue.   HENT:  Negative for sore throat.    Respiratory:  Negative for shortness of breath.    Cardiovascular:  Negative for chest pain.   Gastrointestinal:  Negative for abdominal pain.   Genitourinary:  Negative for dysuria.   Musculoskeletal:  Negative for back pain.   Skin:  Negative for rash.   Neurological:  Negative for headaches.   Hematological:  Negative for adenopathy.   Psychiatric/Behavioral:  The patient is not  nervous/anxious.        ECOG Performance Status:   ECOG SCORE 0       Objective:      Vitals: There were no vitals filed for this visit.  BMI: There is no height or weight on file to calculate BMI.  Deferred since telemedicine visit.    Physical Exam  Deferred since telemedicine visit.    Laboratory Data:  Labs have been reviewed.    Lab Results   Component Value Date    WBC 9.23 12/08/2023    HGB 17.0 12/08/2023    HCT 48.9 12/08/2023    MCV 84 12/08/2023     12/08/2023       Imaging:     - CT abdomen/pelvis (12/8/23): I have personally reviewed the images    Lower thorax:     The visualized portions of the lung bases are clear.  There are no pleural effusions.     Abdomen and pelvis:     The liver, spleen and pancreas enhance normally.  The gallbladder and adrenal glands are unremarkable.  The kidneys enhance normally.     There are postoperative changes involving the cecum.  Minimal fluid is noted in the urinary bladder.  There is no free fluid or lymphadenopathy in the abdomen or pelvis.     A small fatty density umbilical hernia is noted     Delayed images:     There is contrast excretion into both renal pelvocaliceal systems and contrast accumulation in the urinary bladder.     Osseous structures:     The osseous structures are intact     Impression:     No enlarged lymph nodes in the abdomen or pelvis.     Postoperative changes involving the cecum.      - CT abdomen/pelvis (12/7/22): I have personally reviewed the images  CT abdomen:     Scans of the lung bases unremarkable.     Examination of the upper abdomen shows the stomach distended with undigested food, fluid and oral contrast     The liver remains normal in size and shape, there is stable multiple subcentimeter low-density masses in right hepatic lobe most consistent with benign etiology such as micro hamartomas versus volume averaging of small cyst     The spleen, pancreas, adrenals and both kidneys are normal in size and shape with no  worrisome renal masses.     No upper abdominal lymphadenopathy is present.     CT scan pelvis     Delayed scans through the entirety of abdomen and pelvis show both ureters to be symmetric and normal in size.  The bladder is partially filled and normal in size.     There is evidence of prior right colon surgery, the large and small bowel remain normal in caliber.     No lower abdominal lymphadenopathy is present.  Stable bilateral minimally enlarged inguinal lymph nodes again noted.     The patient is post right orchiectomy.     Bone windows show a stable small sclerotic foci in right lateral aspect L1 vertebra consistent with bone island.     Impression:     1.  Stable appearance to abdomen and pelvis with no CT findings suggestive of metastatic disease in patient post right orchiectomy.     2.  Gastric distention of unknown clinical significance      - CT abdomen/pelvis (9/30/21): I have personally reviewed the images  Heart: Normal in size. No pericardial effusion.     Lung Bases: Well aerated, without consolidation or pleural fluid.     Liver: Liver is normal in size and overall attenuation.  There are two subcentimeter hypodense regions within the liver, stable from prior (series 2, image 46, image 28).  Small subcentimeter hypoattenuating focus in the left hepatic lobe (series 2, image 33). .     Gallbladder: Contracted. No calcified gallstones.     Bile Ducts: No evidence of dilated ducts.     Pancreas: No mass or peripancreatic fat stranding.     Spleen: Unremarkable.     Adrenals: Unremarkable.     Kidneys/ Ureters: Normal in size and location.  No hydroureteronephrosis.     Bladder: Allowing for incomplete distention, there is no obvious bladder wall thickening.     Reproductive organs: Postoperative changes of right orchiectomy.  There are prostatic calcifications.     GI Tract/Mesentery: Postoperative changes of appendectomy and small-bowel resection.  No evidence of bowel obstruction or inflammation.      Peritoneal Space: No ascites. No free air.     Retroperitoneum: Nonenlarged para-aortic lymph nodes are seen.  Continued demonstration of increased soft tissue attenuation along the anterior aspect the right iliacus.     Abdominal wall: Continued prominence of the previously mentioned right inguinal lymph node measuring 1.2 cm, previously measuring 1.2 cm (series 2, image 137).  Small fat containing left inguinal hernia.     Vasculature: No significant atherosclerosis or aneurysm.     Bones: No acute fracture.  Redemonstration of sclerotic foci in the L1 vertebral body, left 7th rib, and right femoral head which are all stable from prior.     Impression:     In this patient with a history of testicular cancer status post right orchectomy there are 2 stable hypodense regions within the right lobe of the liver.  There is a subcentimeter hypodensity within the left hepatic lobe not definitely seen prior imaging.     Continued prominence of previously mentioned right inguinal lymph node, stable from prior.     Continued demonstration of the increased heterogenous soft tissue anterior to the right iliacus, which appears stable from prior.    - CT abdomen/pelvis (9/15/20): I have personally reviewed the images  In this patient with history of testicular cancer, following findings are identified:     1. Postsurgical changes from right-sided okay ectomy.     2. No evidence of local disease recurrence or metastatic disease.     Postsurgical changes at the terminal ileum with increased soft tissue density adjacent to this area which may represent sequela of prior infection in a patient with previous known abscess in this region.     2 tiny hypoattenuating lesions within the right hepatic lobe, too small to definitively characterize, however appear similar to prior examinations, and are statistically favored to be benign such as simple hepatic cyst versus hemangioma.     Hyperenhancing focus within anorectal junction, which  may represent vascular malformation versus hemorrhoid.    CT abdomen/pelvis (6/17/20): I have personally reviewed the images  - In this patient with history of testicular cancer with interval postoperative change from right-sided orchiectomy, there is no evidence of metastatic disease.  - Stable postoperative changes near the terminal ileum with a stable soft tissue density adjacent to the cecum.  Questionable 2 cm collection anterior to the right iliopsoas muscle may represent sequelae of prior infection though a small recurrent abscess is not excluded.  Recommend clinical correlation.  - Short segments of wall thickening in the distal descending and sigmoid colon.  This may represent normal peristalsis.  Review of the electronic medical record indicates that patient underwent recent colonoscopy on 08/29/2019 that identified no significant abnormality throughout the entire exam portion of the colon.    CT chest/abdomen/pelvis (3/2/20): I have personally reviewed the images  - In this patient with history of testicular neoplasm, right testicular mass is visualized.  No evidence of metastatic disease, noting small inguinal and periaortic lymph nodes without definite pathologic adenopathy.  - Postoperative change of the terminal ileum, as well as small area of more linear soft tissue density adjacent to the cecum.  This may represent sequela of prior infection as patient with known abscess in this region, with other considerations including postoperative change.    Assessment:       1. History of testicular cancer    2. Crohn's disease of small intestine with other complication    3. Immunodeficiency due to drug therapy         Plan:     1. Seminoma of right testicle - Stage IA (pT1b, pN0, cM0, S0)  - I have reviewed his chart  - 3/3/20: he underwent a right orchiectomy. Pathology revealed a pure seminoma.  - given his stage and this being a seminoma, I recommend surveillance, which is strongly preferred in the  [employer] guidelines (see below).      - CT abdomen/pelvis (6/17/20) was negative for recurrent/metastatic disease. Tumor markers were within normal limits.   - CT abdomen/pelvis (9/15/20) was negative for recurrent/metastatic disease. Tumor markers were within normal limits.   - CT abdomen/pelvis (3/31/21) was negative for recurrent/metastatic disease. Tumor markers were within normal limits.   - CT abdomen/pelvis (9/30/21) was negative for recurrent/metastatic disease. Tumor markers were within normal limits.   - He denies any concerning symptoms to suggest recurrence of cancer.  - CT abdomen/pelvis (12/7/22) was negative for recurrent/metastatic disease. Tumor markers were within normal limits.   - CT abdomen/pelvis (12/8/23) was negative for recurrent/metastatic disease. Tumor markers were within normal limits.   - return to clinic in 1 year with repeat scans/labs.    2. Crohn's disease / immunodeficiency due to drugs  - stable on remicade  - defer management to gastroenterology    3. Advance Care Planning     Power of   After our discussion at previous visit, the patient decided to complete a HCPOA and appointed his  Soledad Chapman ([phone]) .        - return to clinic in 1 year with repeat scans/labs.    London Altamirano M.D.  Hematology/Oncology  [facility] - [address], [address]  [place] [address]  Phone: [phone]  Fax: [phone]

## 2023-12-11 ENCOUNTER — OFFICE VISIT (OUTPATIENT)
Dept: HEMATOLOGY/ONCOLOGY | Facility: CLINIC | Age: 53
End: 2023-12-11
Payer: COMMERCIAL

## 2023-12-11 DIAGNOSIS — Z79.899 IMMUNODEFICIENCY DUE TO DRUG THERAPY: ICD-10-CM

## 2023-12-11 DIAGNOSIS — Z85.47 HISTORY OF TESTICULAR CANCER: Primary | ICD-10-CM

## 2023-12-11 DIAGNOSIS — D84.821 IMMUNODEFICIENCY DUE TO DRUG THERAPY: ICD-10-CM

## 2023-12-11 DIAGNOSIS — K50.018 CROHN'S DISEASE OF SMALL INTESTINE WITH OTHER COMPLICATION: ICD-10-CM

## 2023-12-11 PROCEDURE — 1160F RVW MEDS BY RX/DR IN RCRD: CPT | Mod: CPTII,95,, | Performed by: INTERNAL MEDICINE

## 2023-12-11 PROCEDURE — 99214 PR OFFICE/OUTPT VISIT, EST, LEVL IV, 30-39 MIN: ICD-10-PCS | Mod: 95,,, | Performed by: INTERNAL MEDICINE

## 2023-12-11 PROCEDURE — 1159F MED LIST DOCD IN RCRD: CPT | Mod: CPTII,95,, | Performed by: INTERNAL MEDICINE

## 2023-12-11 PROCEDURE — 99214 OFFICE O/P EST MOD 30 MIN: CPT | Mod: 95,,, | Performed by: INTERNAL MEDICINE

## 2023-12-11 PROCEDURE — 1159F PR MEDICATION LIST DOCUMENTED IN MEDICAL RECORD: ICD-10-PCS | Mod: CPTII,95,, | Performed by: INTERNAL MEDICINE

## 2023-12-11 PROCEDURE — 1160F PR REVIEW ALL MEDS BY PRESCRIBER/CLIN PHARMACIST DOCUMENTED: ICD-10-PCS | Mod: CPTII,95,, | Performed by: INTERNAL MEDICINE

## 2024-12-08 NOTE — PROGRESS NOTES
PATIENT: Chao Chapman  MRN: [id number]  DATE: 12/13/2024    Diagnosis:   1. History of testicular cancer    2. Crohn's disease of small intestine with other complication    3. Immunodeficiency due to drug therapy      Chief Complaint: history of testicular cancer    Oncologic History:      Oncologic History 1. 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 Right orchiectomy (3/3/20)  2. Surveillance      Pathology 3/3/20:  Right testicle, orchiectomy:  -Seminoma (4.5 cm in maximum dimension)  -Tumor is limited to the testis  -Negative for lymphovascular invasion  -No lymph nodes submitted or found  -Pathologic stage classification: pT1b  Synoptic report: TESTIS: Radical orchiectomy  Specimen laterality: Right  Tumor focality: Unifocal  Tumor size: 4.5 x 3.8 x 3.5 cm  Histologic type: Seminoma  Tumor extension: Tumor limited to testis  Spermatic cord margin: Uninvolved by tumor  Lymphovascular invasion: Not identified  Regional lymph nodes: No lymph nodes submitted or found  Pathologicp stage classification (TNM):  Primary tumor (pT): pT1b: Tumor 3 cm or larger in size  Regional lymph nodes (pN): pNX: Regional lymph nodes cannot be assessed        Subjective:    History of Present Illness: Mr. Chapman is a 54 y.o. male who presented in March 2020 for evaluation and management of seminoma of right testicle. He was referred by urologist Dr. Tyler.    Telemedicine visit  The patient location is: work  The chief complaint leading to consultation is: testicular cancer    Visit type: audiovisual    Face to Face time with patient: 8 minutes  10 minutes of total time spent on the encounter, which includes face to face time and non-face to face time preparing to see the patient (eg, review of tests), Obtaining and/or reviewing separately obtained history, Documenting clinical information in the electronic or other health record, Independently interpreting results (not separately  reported) and communicating results to the patient/family/caregiver, or Care coordination (not separately reported).     Each patient to whom he or she provides medical services by telemedicine is:  (1) informed of the relationship between the physician and patient and the respective role of any other health care provider with respect to management of the patient; and (2) notified that he or she may decline to receive medical services by telemedicine and may withdraw from such care at any time.    Notes: see below      - presenting symptom was a right testicular mass.  - Ultrasound and CT imaging confirmed the presence of a testicular mass.  - 3/3/20: he underwent a right orchiectomy. Pathology revealed a pure seminoma.  - he states that his Crohn's disease is in remission.  - he underwent labs/CT scan on 9/30/21.  - he underwent CT scan on 12/7/22.  - he takes remicade infusions for crohn's disease.  - he underwent CT scan on 12/8/23    Interval history:  - he presents for a follow-up appointment for his testicular cancer.  - he underwent CT scan on 12/11/24  - No clinical changes in the past year. He denies shortness of breath, chest pain, nausea, vomiting, diarrhea, constipation.     Past medical, surgical, family, and social histories have been reviewed and updated below.    Past Medical History:   Past Medical History:   Diagnosis Date    Abscess of groin, right     Abscess of right thigh     Anemia     Colonic fistula     Crohn's disease     History of abdominal abscess     Perforated bowel     Severe sepsis        Past Surgical History:   Past Surgical History:   Procedure Laterality Date    COLONOSCOPY      COLONOSCOPY N/A 11/29/2016    Procedure: COLONOSCOPY;  Surgeon: Danika Saunders MD;  Location: [facility];  Service: Endoscopy;  Laterality: N/A;    COLONOSCOPY N/A 8/29/2019    Procedure: COLONOSCOPY;  Surgeon: Melecio Saunders MD;  Location: [facility];  Service: Endoscopy;  Laterality: N/A;     COLONOSCOPY N/A 7/18/2022    Procedure: COLONOSCOPY;  Surgeon: Rosales Mac MD;  Location: [facility];  Service: Endoscopy;  Laterality: N/A;    COLONOSCOPY N/A 9/20/2024    Procedure: COLONOSCOPY;  Surgeon: Melecio Saunders MD;  Location: [facility];  Service: Endoscopy;  Laterality: N/A;    ILEOSTOMY      ILEOSTOMY CLOSURE  07/22/2016    ORCHIECTOMY Right 3/3/2020    Procedure: ORCHIECTOMY;  Surgeon: Harjit Tyler MD;  Location: [address];  Service: Urology;  Laterality: Right;  1.5hr    SKIN BIOPSY      SMALL INTESTINE SURGERY      UPPER GASTROINTESTINAL ENDOSCOPY         Family History:   Family History   Problem Relation Name Age of Onset    Diabetes Maternal Grandmother         Social History:  reports that he has been smoking cigarettes. He has never used smokeless tobacco. He reports current drug use. Drug: Marijuana. He reports that he does not drink alcohol.    Allergies:  Review of patient's allergies indicates:  No Known Allergies    Medications:  Current Outpatient Medications   Medication Sig Dispense Refill    albuterol (VENTOLIN HFA) 90 mcg/actuation inhaler Inhale 2 puffs into the lungs every 6 (six) hours as needed for Wheezing. Rescue 18 g 0    INFLIXIMAB (REMICADE IV) Inject into the vein every 8 weeks.        No current facility-administered medications for this visit.       Review of Systems   Constitutional:  Negative for fatigue.   HENT:  Negative for sore throat.    Respiratory:  Negative for shortness of breath.    Cardiovascular:  Negative for chest pain.   Gastrointestinal:  Negative for abdominal pain.   Genitourinary:  Negative for dysuria.   Musculoskeletal:  Negative for back pain.   Skin:  Negative for rash.   Neurological:  Negative for headaches.   Hematological:  Negative for adenopathy.   Psychiatric/Behavioral:  The patient is not nervous/anxious.        ECOG Performance Status:   ECOG SCORE 0       Objective:      Vitals: There were no vitals filed for this  visit.  BMI: There is no height or weight on file to calculate BMI.  Deferred since telemedicine visit.    Physical Exam  Deferred since telemedicine visit.    Laboratory Data:  Labs have been reviewed.    Lab Results   Component Value Date    WBC 12.17 12/11/2024    HGB 17.8 12/11/2024    HCT 50.9 12/11/2024    MCV 85 12/11/2024     12/11/2024       Imaging:       - CT abdomen/pelvis (12/11/24): I have personally reviewed the images  No new enlarged lymph nodes in the abdomen, pelvis or inguinal regions.     Evidence for prior orchiectomy on the right and prior bowel surgery.     Calcifications in the prostate gland.    - CT abdomen/pelvis (12/8/23): I have personally reviewed the images    Lower thorax:     The visualized portions of the lung bases are clear.  There are no pleural effusions.     Abdomen and pelvis:     The liver, spleen and pancreas enhance normally.  The gallbladder and adrenal glands are unremarkable.  The kidneys enhance normally.     There are postoperative changes involving the cecum.  Minimal fluid is noted in the urinary bladder.  There is no free fluid or lymphadenopathy in the abdomen or pelvis.     A small fatty density umbilical hernia is noted     Delayed images:     There is contrast excretion into both renal pelvocaliceal systems and contrast accumulation in the urinary bladder.     Osseous structures:     The osseous structures are intact     Impression:     No enlarged lymph nodes in the abdomen or pelvis.     Postoperative changes involving the cecum.      - CT abdomen/pelvis (12/7/22): I have personally reviewed the images  CT abdomen:     Scans of the lung bases unremarkable.     Examination of the upper abdomen shows the stomach distended with undigested food, fluid and oral contrast     The liver remains normal in size and shape, there is stable multiple subcentimeter low-density masses in right hepatic lobe most consistent with benign etiology such as micro hamartomas  versus volume averaging of small cyst     The spleen, pancreas, adrenals and both kidneys are normal in size and shape with no worrisome renal masses.     No upper abdominal lymphadenopathy is present.     CT scan pelvis     Delayed scans through the entirety of abdomen and pelvis show both ureters to be symmetric and normal in size.  The bladder is partially filled and normal in size.     There is evidence of prior right colon surgery, the large and small bowel remain normal in caliber.     No lower abdominal lymphadenopathy is present.  Stable bilateral minimally enlarged inguinal lymph nodes again noted.     The patient is post right orchiectomy.     Bone windows show a stable small sclerotic foci in right lateral aspect L1 vertebra consistent with bone island.     Impression:     1.  Stable appearance to abdomen and pelvis with no CT findings suggestive of metastatic disease in patient post right orchiectomy.     2.  Gastric distention of unknown clinical significance      - CT abdomen/pelvis (9/30/21): I have personally reviewed the images  Heart: Normal in size. No pericardial effusion.     Lung Bases: Well aerated, without consolidation or pleural fluid.     Liver: Liver is normal in size and overall attenuation.  There are two subcentimeter hypodense regions within the liver, stable from prior (series 2, image 46, image 28).  Small subcentimeter hypoattenuating focus in the left hepatic lobe (series 2, image 33). .     Gallbladder: Contracted. No calcified gallstones.     Bile Ducts: No evidence of dilated ducts.     Pancreas: No mass or peripancreatic fat stranding.     Spleen: Unremarkable.     Adrenals: Unremarkable.     Kidneys/ Ureters: Normal in size and location.  No hydroureteronephrosis.     Bladder: Allowing for incomplete distention, there is no obvious bladder wall thickening.     Reproductive organs: Postoperative changes of right orchiectomy.  There are prostatic calcifications.     GI  Tract/Mesentery: Postoperative changes of appendectomy and small-bowel resection.  No evidence of bowel obstruction or inflammation.     Peritoneal Space: No ascites. No free air.     Retroperitoneum: Nonenlarged para-aortic lymph nodes are seen.  Continued demonstration of increased soft tissue attenuation along the anterior aspect the right iliacus.     Abdominal wall: Continued prominence of the previously mentioned right inguinal lymph node measuring 1.2 cm, previously measuring 1.2 cm (series 2, image 137).  Small fat containing left inguinal hernia.     Vasculature: No significant atherosclerosis or aneurysm.     Bones: No acute fracture.  Redemonstration of sclerotic foci in the L1 vertebral body, left 7th rib, and right femoral head which are all stable from prior.     Impression:     In this patient with a history of testicular cancer status post right orchectomy there are 2 stable hypodense regions within the right lobe of the liver.  There is a subcentimeter hypodensity within the left hepatic lobe not definitely seen prior imaging.     Continued prominence of previously mentioned right inguinal lymph node, stable from prior.     Continued demonstration of the increased heterogenous soft tissue anterior to the right iliacus, which appears stable from prior.    - CT abdomen/pelvis (9/15/20): I have personally reviewed the images  In this patient with history of testicular cancer, following findings are identified:     1. Postsurgical changes from right-sided okay ectomy.     2. No evidence of local disease recurrence or metastatic disease.     Postsurgical changes at the terminal ileum with increased soft tissue density adjacent to this area which may represent sequela of prior infection in a patient with previous known abscess in this region.     2 tiny hypoattenuating lesions within the right hepatic lobe, too small to definitively characterize, however appear similar to prior examinations, and are  statistically favored to be benign such as simple hepatic cyst versus hemangioma.     Hyperenhancing focus within anorectal junction, which may represent vascular malformation versus hemorrhoid.    CT abdomen/pelvis (6/17/20): I have personally reviewed the images  - In this patient with history of testicular cancer with interval postoperative change from right-sided orchiectomy, there is no evidence of metastatic disease.  - Stable postoperative changes near the terminal ileum with a stable soft tissue density adjacent to the cecum.  Questionable 2 cm collection anterior to the right iliopsoas muscle may represent sequelae of prior infection though a small recurrent abscess is not excluded.  Recommend clinical correlation.  - Short segments of wall thickening in the distal descending and sigmoid colon.  This may represent normal peristalsis.  Review of the electronic medical record indicates that patient underwent recent colonoscopy on 08/29/2019 that identified no significant abnormality throughout the entire exam portion of the colon.    CT chest/abdomen/pelvis (3/2/20): I have personally reviewed the images  - In this patient with history of testicular neoplasm, right testicular mass is visualized.  No evidence of metastatic disease, noting small inguinal and periaortic lymph nodes without definite pathologic adenopathy.  - Postoperative change of the terminal ileum, as well as small area of more linear soft tissue density adjacent to the cecum.  This may represent sequela of prior infection as patient with known abscess in this region, with other considerations including postoperative change.    Assessment:       1. History of testicular cancer    2. Crohn's disease of small intestine with other complication    3. Immunodeficiency due to drug therapy         Plan:     1. Seminoma of right testicle - Stage IA (pT1b, pN0, cM0, S0)  - I have reviewed his chart  - 3/3/20: he underwent a right orchiectomy. Pathology  revealed a pure seminoma.  - given his stage and this being a seminoma, I recommend surveillance, which is strongly preferred in the [employer] guidelines (see below).      - CT abdomen/pelvis (6/17/20) was negative for recurrent/metastatic disease. Tumor markers were within normal limits.   - CT abdomen/pelvis (9/15/20) was negative for recurrent/metastatic disease. Tumor markers were within normal limits.   - CT abdomen/pelvis (3/31/21) was negative for recurrent/metastatic disease. Tumor markers were within normal limits.   - CT abdomen/pelvis (9/30/21) was negative for recurrent/metastatic disease. Tumor markers were within normal limits.   - He denies any concerning symptoms to suggest recurrence of cancer.  - CT abdomen/pelvis (12/7/22) was negative for recurrent/metastatic disease. Tumor markers were within normal limits.   - CT abdomen/pelvis (12/8/23) was negative for recurrent/metastatic disease. Tumor markers were within normal limits.   - CT abdomen/pelvis (12/11/24): was negative for recurrent/metastatic disease. Tumor markers were within normal limits.   - return to clinic in 2 years with repeat scans/labs.    2. Crohn's disease / immunodeficiency due to drugs  - stable on remicade  - defer management to gastroenterology    3. Advance Care Planning     Power of   After our discussion at previous visit, the patient decided to complete a HCPOA and appointed his  Soledad Chapman ([phone]) .        - return to clinic in 2 years with repeat scans/labs.    London Altamirano M.D.  Hematology/Oncology  [facility] - [address], [address]  [place] [address]  Phone: [phone]  Fax: [phone]

## 2024-12-12 ENCOUNTER — TELEPHONE (OUTPATIENT)
Dept: HEMATOLOGY/ONCOLOGY | Facility: CLINIC | Age: 54
End: 2024-12-12
Payer: COMMERCIAL

## 2024-12-13 ENCOUNTER — OFFICE VISIT (OUTPATIENT)
Dept: HEMATOLOGY/ONCOLOGY | Facility: CLINIC | Age: 54
End: 2024-12-13
Payer: COMMERCIAL

## 2024-12-13 DIAGNOSIS — Z79.899 IMMUNODEFICIENCY DUE TO DRUG THERAPY: ICD-10-CM

## 2024-12-13 DIAGNOSIS — Z85.47 HISTORY OF TESTICULAR CANCER: Primary | ICD-10-CM

## 2024-12-13 DIAGNOSIS — D84.821 IMMUNODEFICIENCY DUE TO DRUG THERAPY: ICD-10-CM

## 2024-12-13 DIAGNOSIS — K50.018 CROHN'S DISEASE OF SMALL INTESTINE WITH OTHER COMPLICATION: ICD-10-CM

## (undated) DEVICE — SEE MEDLINE ITEM 152622

## (undated) DEVICE — GAUZE SPONGE PEANUT STRL

## (undated) DEVICE — DRESSING ABSRBNT ISLAND 3.6X8

## (undated) DEVICE — DRESSING TELFA STRL 4X3 LF

## (undated) DEVICE — GLOVE SURG BIOGEL LATEX SZ 7.5

## (undated) DEVICE — ADHESIVE MASTISOL VIAL 48/BX

## (undated) DEVICE — ELECTRODE REM PLYHSV RETURN 9

## (undated) DEVICE — DRAIN PENROSE XRAY 12 X 1/4 ST

## (undated) DEVICE — SUT SILK 0 BLK BR CT-1 30IN

## (undated) DEVICE — SUT 3-0 VICRYL / RB-1

## (undated) DEVICE — SUT VICRYL PLUS 4-0 P3 18IN

## (undated) DEVICE — TRAY MINOR GEN SURG

## (undated) DEVICE — CLIPPER BLADE MOD 4406 (CAREF)

## (undated) DEVICE — CLOSURE SKIN STERI STRIP 1/2X4

## (undated) DEVICE — SUT 2-0 VICRYL / CT-1

## (undated) DEVICE — SEE MEDLINE ITEM 157148

## (undated) DEVICE — GAUZE FLUFF XXLG 36X36 2 PLY

## (undated) DEVICE — SUSPENSORY X-LARGE

## (undated) DEVICE — NDL HYPO A BEVEL 25X1 1/2

## (undated) DEVICE — DILATERIA MEDIUM-THICK

## (undated) DEVICE — LINER GLOVE POWDERFREE 8

## (undated) DEVICE — DRESSING TRANS 4X4 TEGADERM

## (undated) DEVICE — SUT 0 18IN SILK BLK BRAIDE